# Patient Record
Sex: FEMALE | Race: WHITE | Employment: OTHER | URBAN - METROPOLITAN AREA
[De-identification: names, ages, dates, MRNs, and addresses within clinical notes are randomized per-mention and may not be internally consistent; named-entity substitution may affect disease eponyms.]

---

## 2022-12-19 ENCOUNTER — APPOINTMENT (OUTPATIENT)
Dept: GENERAL RADIOLOGY | Age: 72
DRG: 481 | End: 2022-12-19
Payer: MEDICARE

## 2022-12-19 ENCOUNTER — HOSPITAL ENCOUNTER (INPATIENT)
Age: 72
LOS: 3 days | Discharge: HOME HEALTH | DRG: 481 | End: 2022-12-23
Attending: EMERGENCY MEDICINE | Admitting: HOSPITALIST
Payer: MEDICARE
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
## 2022-12-19 DIAGNOSIS — S72.141A CLOSED INTERTROCHANTERIC FRACTURE OF HIP, RIGHT, INITIAL ENCOUNTER (HCC): Primary | ICD-10-CM

## 2022-12-19 LAB
ALBUMIN SERPL-MCNC: 3.9 G/DL (ref 3.2–4.6)
ALBUMIN/GLOB SERPL: 1.1 {RATIO} (ref 0.4–1.6)
ALP SERPL-CCNC: 71 U/L (ref 50–136)
ALT SERPL-CCNC: 31 U/L (ref 12–65)
ANION GAP SERPL CALC-SCNC: 5 MMOL/L (ref 2–11)
AST SERPL-CCNC: 35 U/L (ref 15–37)
BASOPHILS # BLD: 0.1 K/UL (ref 0–0.2)
BASOPHILS NFR BLD: 1 % (ref 0–2)
BILIRUB SERPL-MCNC: 1.3 MG/DL (ref 0.2–1.1)
BUN SERPL-MCNC: 15 MG/DL (ref 8–23)
CALCIUM SERPL-MCNC: 9.2 MG/DL (ref 8.3–10.4)
CHLORIDE SERPL-SCNC: 104 MMOL/L (ref 101–110)
CO2 SERPL-SCNC: 26 MMOL/L (ref 21–32)
CREAT SERPL-MCNC: 0.6 MG/DL (ref 0.6–1)
DIFFERENTIAL METHOD BLD: ABNORMAL
EOSINOPHIL # BLD: 0.1 K/UL (ref 0–0.8)
EOSINOPHIL NFR BLD: 1 % (ref 0.5–7.8)
ERYTHROCYTE [DISTWIDTH] IN BLOOD BY AUTOMATED COUNT: 13.3 % (ref 11.9–14.6)
GLOBULIN SER CALC-MCNC: 3.6 G/DL (ref 2.8–4.5)
GLUCOSE SERPL-MCNC: 103 MG/DL (ref 65–100)
HCT VFR BLD AUTO: 39.7 % (ref 35.8–46.3)
HGB BLD-MCNC: 13.2 G/DL (ref 11.7–15.4)
IMM GRANULOCYTES # BLD AUTO: 0.1 K/UL (ref 0–0.5)
IMM GRANULOCYTES NFR BLD AUTO: 1 % (ref 0–5)
LYMPHOCYTES # BLD: 1 K/UL (ref 0.5–4.6)
LYMPHOCYTES NFR BLD: 9 % (ref 13–44)
MCH RBC QN AUTO: 33.5 PG (ref 26.1–32.9)
MCHC RBC AUTO-ENTMCNC: 33.2 G/DL (ref 31.4–35)
MCV RBC AUTO: 100.8 FL (ref 82–102)
MONOCYTES # BLD: 0.6 K/UL (ref 0.1–1.3)
MONOCYTES NFR BLD: 6 % (ref 4–12)
NEUTS SEG # BLD: 8.7 K/UL (ref 1.7–8.2)
NEUTS SEG NFR BLD: 82 % (ref 43–78)
NRBC # BLD: 0 K/UL (ref 0–0.2)
PLATELET # BLD AUTO: 269 K/UL (ref 150–450)
PMV BLD AUTO: 9.7 FL (ref 9.4–12.3)
POTASSIUM SERPL-SCNC: 3.6 MMOL/L (ref 3.5–5.1)
PROT SERPL-MCNC: 7.5 G/DL (ref 6.3–8.2)
RBC # BLD AUTO: 3.94 M/UL (ref 4.05–5.2)
SODIUM SERPL-SCNC: 135 MMOL/L (ref 133–143)
WBC # BLD AUTO: 10.5 K/UL (ref 4.3–11.1)

## 2022-12-19 PROCEDURE — 85025 COMPLETE CBC W/AUTO DIFF WBC: CPT

## 2022-12-19 PROCEDURE — 99285 EMERGENCY DEPT VISIT HI MDM: CPT

## 2022-12-19 PROCEDURE — 71045 X-RAY EXAM CHEST 1 VIEW: CPT

## 2022-12-19 PROCEDURE — 73590 X-RAY EXAM OF LOWER LEG: CPT

## 2022-12-19 PROCEDURE — 73552 X-RAY EXAM OF FEMUR 2/>: CPT

## 2022-12-19 PROCEDURE — 73502 X-RAY EXAM HIP UNI 2-3 VIEWS: CPT

## 2022-12-19 PROCEDURE — 86900 BLOOD TYPING SEROLOGIC ABO: CPT

## 2022-12-19 PROCEDURE — 80053 COMPREHEN METABOLIC PANEL: CPT

## 2022-12-19 RX ORDER — AMLODIPINE BESYLATE 10 MG/1
5 TABLET ORAL
Status: COMPLETED | OUTPATIENT
Start: 2022-12-19 | End: 2022-12-20

## 2022-12-19 ASSESSMENT — PAIN DESCRIPTION - ORIENTATION: ORIENTATION: RIGHT

## 2022-12-19 ASSESSMENT — PAIN DESCRIPTION - LOCATION: LOCATION: KNEE

## 2022-12-19 ASSESSMENT — PAIN SCALES - GENERAL: PAINLEVEL_OUTOF10: 3

## 2022-12-19 ASSESSMENT — ENCOUNTER SYMPTOMS: COUGH: 0

## 2022-12-20 ENCOUNTER — ANESTHESIA EVENT (OUTPATIENT)
Dept: SURGERY | Age: 72
DRG: 481 | End: 2022-12-20
Payer: MEDICARE

## 2022-12-20 PROBLEM — E78.00 HYPERCHOLESTEROLEMIA: Status: ACTIVE | Noted: 2017-09-06

## 2022-12-20 PROBLEM — S72.001A CLOSED RIGHT HIP FRACTURE, INITIAL ENCOUNTER (HCC): Status: ACTIVE | Noted: 2022-12-20

## 2022-12-20 LAB
ABO + RH BLD: NORMAL
APPEARANCE UR: CLEAR
BILIRUB UR QL: NEGATIVE
BLOOD GROUP ANTIBODIES SERPL: NORMAL
COLOR UR: ABNORMAL
GLUCOSE UR STRIP.AUTO-MCNC: NEGATIVE MG/DL
HGB UR QL STRIP: NEGATIVE
KETONES UR QL STRIP.AUTO: 80 MG/DL
LEUKOCYTE ESTERASE UR QL STRIP.AUTO: NEGATIVE
NITRITE UR QL STRIP.AUTO: NEGATIVE
PH UR STRIP: 6 [PH] (ref 5–9)
PROT UR STRIP-MCNC: NEGATIVE MG/DL
SP GR UR REFRACTOMETRY: 1.02 (ref 1–1.02)
SPECIMEN EXP DATE BLD: NORMAL
UROBILINOGEN UR QL STRIP.AUTO: 0.2 EU/DL (ref 0.2–1)

## 2022-12-20 PROCEDURE — 6370000000 HC RX 637 (ALT 250 FOR IP): Performed by: EMERGENCY MEDICINE

## 2022-12-20 PROCEDURE — 81003 URINALYSIS AUTO W/O SCOPE: CPT

## 2022-12-20 PROCEDURE — 6360000002 HC RX W HCPCS: Performed by: FAMILY MEDICINE

## 2022-12-20 PROCEDURE — 2580000003 HC RX 258: Performed by: HOSPITALIST

## 2022-12-20 PROCEDURE — 6360000002 HC RX W HCPCS: Performed by: EMERGENCY MEDICINE

## 2022-12-20 PROCEDURE — 6370000000 HC RX 637 (ALT 250 FOR IP): Performed by: HOSPITALIST

## 2022-12-20 PROCEDURE — 1100000000 HC RM PRIVATE

## 2022-12-20 PROCEDURE — 99222 1ST HOSP IP/OBS MODERATE 55: CPT | Performed by: ORTHOPAEDIC SURGERY

## 2022-12-20 RX ORDER — MORPHINE SULFATE 4 MG/ML
4 INJECTION INTRAVENOUS ONCE
Status: COMPLETED | OUTPATIENT
Start: 2022-12-20 | End: 2022-12-20

## 2022-12-20 RX ORDER — MORPHINE SULFATE 2 MG/ML
2 INJECTION, SOLUTION INTRAMUSCULAR; INTRAVENOUS
Status: DISCONTINUED | OUTPATIENT
Start: 2022-12-20 | End: 2022-12-21 | Stop reason: SDUPTHER

## 2022-12-20 RX ORDER — ACETAMINOPHEN 160 MG
3 TABLET,DISINTEGRATING ORAL
COMMUNITY

## 2022-12-20 RX ORDER — AMLODIPINE BESYLATE 5 MG/1
5 TABLET ORAL DAILY
COMMUNITY
Start: 2022-10-04 | End: 2023-10-04

## 2022-12-20 RX ORDER — METOPROLOL SUCCINATE 50 MG/1
50 TABLET, EXTENDED RELEASE ORAL DAILY
Status: DISCONTINUED | OUTPATIENT
Start: 2022-12-20 | End: 2022-12-23 | Stop reason: HOSPADM

## 2022-12-20 RX ORDER — ATORVASTATIN CALCIUM 10 MG/1
10 TABLET, FILM COATED ORAL DAILY
Status: DISCONTINUED | OUTPATIENT
Start: 2022-12-20 | End: 2022-12-23 | Stop reason: HOSPADM

## 2022-12-20 RX ORDER — SODIUM CHLORIDE 0.9 % (FLUSH) 0.9 %
5-40 SYRINGE (ML) INJECTION PRN
Status: DISCONTINUED | OUTPATIENT
Start: 2022-12-20 | End: 2022-12-23 | Stop reason: HOSPADM

## 2022-12-20 RX ORDER — OXYCODONE AND ACETAMINOPHEN 7.5; 325 MG/1; MG/1
1 TABLET ORAL EVERY 6 HOURS PRN
Status: DISCONTINUED | OUTPATIENT
Start: 2022-12-20 | End: 2022-12-21 | Stop reason: SDUPTHER

## 2022-12-20 RX ORDER — ONDANSETRON 2 MG/ML
4 INJECTION INTRAMUSCULAR; INTRAVENOUS EVERY 6 HOURS PRN
Status: DISCONTINUED | OUTPATIENT
Start: 2022-12-20 | End: 2022-12-21 | Stop reason: SDUPTHER

## 2022-12-20 RX ORDER — ACETAMINOPHEN 325 MG/1
650 TABLET ORAL EVERY 6 HOURS PRN
Status: DISCONTINUED | OUTPATIENT
Start: 2022-12-20 | End: 2022-12-23 | Stop reason: HOSPADM

## 2022-12-20 RX ORDER — ONDANSETRON 2 MG/ML
4 INJECTION INTRAMUSCULAR; INTRAVENOUS ONCE
Status: COMPLETED | OUTPATIENT
Start: 2022-12-20 | End: 2022-12-20

## 2022-12-20 RX ORDER — OXYCODONE AND ACETAMINOPHEN 7.5; 325 MG/1; MG/1
1 TABLET ORAL EVERY 4 HOURS PRN
Status: ON HOLD | COMMUNITY
Start: 2012-02-14 | End: 2022-12-22 | Stop reason: HOSPADM

## 2022-12-20 RX ORDER — ONDANSETRON 4 MG/1
4 TABLET, ORALLY DISINTEGRATING ORAL EVERY 8 HOURS PRN
Status: DISCONTINUED | OUTPATIENT
Start: 2022-12-20 | End: 2022-12-21 | Stop reason: SDUPTHER

## 2022-12-20 RX ORDER — FAMOTIDINE 40 MG/1
40 TABLET, FILM COATED ORAL 2 TIMES DAILY
COMMUNITY
Start: 2022-08-01

## 2022-12-20 RX ORDER — ZOLPIDEM TARTRATE 10 MG/1
10 TABLET ORAL
COMMUNITY
Start: 2022-03-17

## 2022-12-20 RX ORDER — SODIUM CHLORIDE 9 MG/ML
INJECTION, SOLUTION INTRAVENOUS PRN
Status: DISCONTINUED | OUTPATIENT
Start: 2022-12-20 | End: 2022-12-23 | Stop reason: HOSPADM

## 2022-12-20 RX ORDER — ENOXAPARIN SODIUM 100 MG/ML
40 INJECTION SUBCUTANEOUS EVERY 24 HOURS
Status: DISCONTINUED | OUTPATIENT
Start: 2022-12-20 | End: 2022-12-23

## 2022-12-20 RX ORDER — SODIUM CHLORIDE 0.9 % (FLUSH) 0.9 %
5-40 SYRINGE (ML) INJECTION EVERY 12 HOURS SCHEDULED
Status: DISCONTINUED | OUTPATIENT
Start: 2022-12-20 | End: 2022-12-23 | Stop reason: HOSPADM

## 2022-12-20 RX ORDER — CALCIUM CARBONATE 500(1250)
1000 TABLET ORAL DAILY
COMMUNITY

## 2022-12-20 RX ORDER — POLYETHYLENE GLYCOL 3350 17 G/17G
17 POWDER, FOR SOLUTION ORAL DAILY PRN
Status: DISCONTINUED | OUTPATIENT
Start: 2022-12-20 | End: 2022-12-23 | Stop reason: HOSPADM

## 2022-12-20 RX ORDER — METOPROLOL SUCCINATE 100 MG/1
50 TABLET, EXTENDED RELEASE ORAL DAILY
COMMUNITY
Start: 2022-10-04 | End: 2023-10-04

## 2022-12-20 RX ORDER — ATORVASTATIN CALCIUM 10 MG/1
10 TABLET, FILM COATED ORAL
COMMUNITY
Start: 2022-10-04

## 2022-12-20 RX ORDER — ACETAMINOPHEN 650 MG/1
650 SUPPOSITORY RECTAL EVERY 6 HOURS PRN
Status: DISCONTINUED | OUTPATIENT
Start: 2022-12-20 | End: 2022-12-23 | Stop reason: HOSPADM

## 2022-12-20 RX ORDER — SODIUM CHLORIDE 9 MG/ML
INJECTION, SOLUTION INTRAVENOUS CONTINUOUS
Status: DISCONTINUED | OUTPATIENT
Start: 2022-12-20 | End: 2022-12-23

## 2022-12-20 RX ORDER — ASPIRIN 81 MG/1
81 TABLET ORAL EVERY MORNING
Status: ON HOLD | COMMUNITY
End: 2022-12-22 | Stop reason: HOSPADM

## 2022-12-20 RX ADMIN — AMLODIPINE BESYLATE 5 MG: 10 TABLET ORAL at 01:46

## 2022-12-20 RX ADMIN — MORPHINE SULFATE 2 MG: 2 INJECTION, SOLUTION INTRAMUSCULAR; INTRAVENOUS at 09:29

## 2022-12-20 RX ADMIN — SODIUM CHLORIDE, PRESERVATIVE FREE 10 ML: 5 INJECTION INTRAVENOUS at 20:36

## 2022-12-20 RX ADMIN — OXYCODONE AND ACETAMINOPHEN 1 TABLET: 7.5; 325 TABLET ORAL at 20:40

## 2022-12-20 RX ADMIN — SODIUM CHLORIDE: 9 INJECTION, SOLUTION INTRAVENOUS at 03:34

## 2022-12-20 RX ADMIN — ENOXAPARIN SODIUM 40 MG: 100 INJECTION SUBCUTANEOUS at 14:08

## 2022-12-20 RX ADMIN — ONDANSETRON 4 MG: 2 INJECTION INTRAMUSCULAR; INTRAVENOUS at 01:45

## 2022-12-20 RX ADMIN — MORPHINE SULFATE 4 MG: 4 INJECTION INTRAVENOUS at 01:45

## 2022-12-20 ASSESSMENT — PAIN SCALES - GENERAL
PAINLEVEL_OUTOF10: 5
PAINLEVEL_OUTOF10: 5
PAINLEVEL_OUTOF10: 0

## 2022-12-20 ASSESSMENT — PAIN DESCRIPTION - ORIENTATION: ORIENTATION: MID

## 2022-12-20 ASSESSMENT — PAIN DESCRIPTION - LOCATION: LOCATION: BACK

## 2022-12-20 ASSESSMENT — PAIN DESCRIPTION - PAIN TYPE: TYPE: ACUTE PAIN

## 2022-12-20 ASSESSMENT — PAIN DESCRIPTION - ONSET: ONSET: GRADUAL

## 2022-12-20 ASSESSMENT — PAIN DESCRIPTION - FREQUENCY: FREQUENCY: INTERMITTENT

## 2022-12-20 ASSESSMENT — PAIN - FUNCTIONAL ASSESSMENT: PAIN_FUNCTIONAL_ASSESSMENT: PREVENTS OR INTERFERES SOME ACTIVE ACTIVITIES AND ADLS

## 2022-12-20 ASSESSMENT — PAIN DESCRIPTION - DESCRIPTORS: DESCRIPTORS: ACHING

## 2022-12-20 NOTE — PROGRESS NOTES
Hospitalist Progress Note   Admit Date:  2022  8:13 PM   Name:  Carmella Burgos   Age:  67 y.o. Sex:  female  :  1950   MRN:  [id number]   Room:  ER02/02    Presenting Complaint: Fall     Reason(s) for Admission: Closed right hip fracture, initial encounter [facility] [facility] [place]   77-year-old female with history of HTN presented to the ED after due to a fall after she tripped over by her dog and landed on her right hip. In the ER, XR right hip shows intertrochanteric right hip fracture. Orthopedic surgery was consulted and plans for right gamma nail insertion procedure. Subjective & 24hr Events (22): Patient was alert and oriented x3 this morning. Son at bedside. She reported ongoing right hip pain. Otherwise, denies fever, chills, SOB, chest pain, abdominal pain. Assessment & Plan:     Closed right hip fracture, initial encounter   2/2 mechanical fall. XR right hip shows intertrochanteric right hip fracture. Follow Hgb&Hct post-op  PT/OT post-op  Analgesics prn  DVT ppx per primary  Ortho on board, appreciate recs  Plans for OR on     Hypercholesterolemia  Cont statin    Essential hypertension  Cont home metoprolol      Anticipated discharge needs:      2-3 pending surgery, ortho recs. PT/OT to eval    Diet:  ADULT DIET; Regular  Diet NPO  DVT PPx: Lovenox SQ  Code status: Full Code    Hospital Problems:  Principal Problem:    Closed right hip fracture, initial encounter [facility])  Active Problems:    Hypercholesterolemia    Essential hypertension  Resolved Problems:    * No resolved hospital problems.  *      Objective:   Patient Vitals for the past 24 hrs:   Temp Pulse Resp BP SpO2   22 1145 -- 62 27 -- --   22 1130 -- 57 15 -- --   22 1115 -- 58 14 -- --   22 1100 -- 58 13 -- --   22 1045 -- 58 18 -- --   22 1030 -- 57 16 -- --   22 1015 -- 62 18 -- --   22 1000 -- 61 18 -- --   22 0945 -- 60 29 -- -- 12/20/22 0930 -- 59 21 -- --   12/20/22 0845 -- 60 19 102/68 --   12/20/22 0830 -- 60 15 112/75 --   12/20/22 0815 -- 61 18 108/76 --   12/20/22 0800 -- 59 17 110/75 --   12/20/22 0745 -- 60 18 93/69 --   12/20/22 0730 -- 57 17 96/72 --   12/20/22 0715 -- 55 13 98/82 --   12/20/22 0546 -- 62 20 109/67 --   12/20/22 0530 -- 54 14 95/68 --   12/20/22 0515 -- 60 12 99/69 --   12/20/22 0500 -- 53 12 97/63 --   12/20/22 0445 -- 52 13 101/71 --   12/20/22 0430 -- 55 13 106/83 --   12/20/22 0415 -- 54 17 99/71 --   12/20/22 0400 -- 56 13 105/72 --   12/20/22 0345 -- 55 14 101/73 --   12/20/22 0330 -- 57 18 102/68 --   12/20/22 0315 -- 57 22 106/67 --   12/20/22 0200 -- 63 17 117/75 --   12/20/22 0146 -- -- -- (!) 140/109 --   12/20/22 0145 -- -- 20 -- --   12/20/22 0100 -- 61 15 119/74 --   12/20/22 0045 -- 57 21 119/74 --   12/20/22 0030 -- 59 18 118/78 --   12/20/22 0015 -- 55 15 117/72 --   12/20/22 0000 -- 56 15 121/74 --   12/19/22 2345 -- 56 16 115/79 --   12/19/22 2330 -- 60 13 100/74 --   12/19/22 2315 -- 61 18 116/81 --   12/19/22 2300 -- 57 21 111/80 --   12/19/22 2245 -- 54 16 123/80 --   12/19/22 2045 -- -- -- -- 100 %   12/19/22 2044 98.3 °F (36.8 °C) 70 20 -- --   12/19/22 2043 98.3 °F (36.8 °C) -- -- -- --   12/19/22 2033 -- 53 18 -- --   12/19/22 2032 -- 67 20 -- --   12/19/22 2031 -- 56 19 -- --   12/19/22 2030 -- -- -- 138/80 --   12/19/22 2027 -- -- -- 132/80 --   12/19/22 2020 -- -- -- 126/82 --       Oxygen Therapy  SpO2: 100 %  O2 Device: None (Room air)    Estimated body mass index is 21.97 kg/m² as calculated from the following:    Height as of this encounter: 5' 4\" (1.626 m). Weight as of this encounter: 128 lb (58.1 kg). No intake or output data in the 24 hours ending 12/20/22 1310      Physical Exam:     Blood pressure 102/68, pulse 62, temperature 98.3 °F (36.8 °C), temperature source Oral, resp. rate 27, height 5' 4\" (1.626 m), weight 128 lb (58.1 kg), SpO2 100 %.   General:    Well nourished. Head:  Normocephalic, atraumatic  Eyes:  Sclerae appear normal.  Pupils equally round. ENT:  Nares appear normal, no drainage. Moist oral mucosa  Neck:  No restricted ROM. Trachea midline   CV:   RRR. No m/r/g. No jugular venous distension. Lungs:   CTAB. No wheezing, rhonchi, or rales. Symmetric expansion. Abdomen: Bowel sounds present. Soft, nontender, nondistended. Extremities: No cyanosis or clubbing. No edema  Skin:     No rashes and normal coloration. Warm and dry. Neuro:  CN II-XII grossly intact. Sensation intact. A&Ox3  Psych:  Normal mood and affect.       I have personally reviewed labs and tests showing:  Recent Labs:  Recent Results (from the past 48 hour(s))   CBC with Auto Differential    Collection Time: 12/19/22 10:22 PM   Result Value Ref Range    WBC 10.5 4.3 - 11.1 K/uL    RBC 3.94 (L) 4.05 - 5.2 M/uL    Hemoglobin 13.2 11.7 - 15.4 g/dL    Hematocrit 39.7 35.8 - 46.3 %    .8 82 - 102 FL    MCH 33.5 (H) 26.1 - 32.9 PG    MCHC 33.2 31.4 - 35.0 g/dL    RDW 13.3 11.9 - 14.6 %    Platelets 969 638 - 624 K/uL    MPV 9.7 9.4 - 12.3 FL    nRBC 0.00 0.0 - 0.2 K/uL    Differential Type AUTOMATED      Seg Neutrophils 82 (H) 43 - 78 %    Lymphocytes 9 (L) 13 - 44 %    Monocytes 6 4.0 - 12.0 %    Eosinophils % 1 0.5 - 7.8 %    Basophils 1 0.0 - 2.0 %    Immature Granulocytes 1 0.0 - 5.0 %    Segs Absolute 8.7 (H) 1.7 - 8.2 K/UL    Absolute Lymph # 1.0 0.5 - 4.6 K/UL    Absolute Mono # 0.6 0.1 - 1.3 K/UL    Absolute Eos # 0.1 0.0 - 0.8 K/UL    Basophils Absolute 0.1 0.0 - 0.2 K/UL    Absolute Immature Granulocyte 0.1 0.0 - 0.5 K/UL   Comprehensive Metabolic Panel    Collection Time: 12/19/22 10:22 PM   Result Value Ref Range    Sodium 135 133 - 143 mmol/L    Potassium 3.6 3.5 - 5.1 mmol/L    Chloride 104 101 - 110 mmol/L    CO2 26 21 - 32 mmol/L    Anion Gap 5 2 - 11 mmol/L    Glucose 103 (H) 65 - 100 mg/dL    BUN 15 8 - 23 MG/DL    Creatinine 0.60 0.6 - 1.0 MG/DL Est, Glom Filt Rate >60 >60 ml/min/1.73m2    Calcium 9.2 8.3 - 10.4 MG/DL    Total Bilirubin 1.3 (H) 0.2 - 1.1 MG/DL    ALT 31 12 - 65 U/L    AST 35 15 - 37 U/L    Alk Phosphatase 71 50 - 136 U/L    Total Protein 7.5 6.3 - 8.2 g/dL    Albumin 3.9 3.2 - 4.6 g/dL    Globulin 3.6 2.8 - 4.5 g/dL    Albumin/Globulin Ratio 1.1 0.4 - 1.6     TYPE AND SCREEN    Collection Time: 12/19/22 10:22 PM   Result Value Ref Range    Crossmatch expiration date 12/22/2022,2359     ABO/Rh O POSITIVE     Antibody Screen NEG    Urinalysis    Collection Time: 12/20/22  2:14 AM   Result Value Ref Range    Color, UA YELLOW/STRAW      Appearance CLEAR      Specific Gravity, UA 1.021 1.001 - 1.023      pH, Urine 6.0 5.0 - 9.0      Protein, UA Negative NEG mg/dL    Glucose, UA Negative mg/dL    Ketones, Urine 80 (A) NEG mg/dL    Bilirubin Urine Negative NEG      Blood, Urine Negative NEG      Urobilinogen, Urine 0.2 0.2 - 1.0 EU/dL    Nitrite, Urine Negative NEG      Leukocyte Esterase, Urine Negative NEG         I have personally reviewed imaging studies showing: Other Studies:  XR HIP RIGHT (2-3 VIEWS)   Final Result   Intertrochanteric right hip fracture         XR FEMUR RIGHT (MIN 2 VIEWS)   Final Result   Intertrochanteric right hip fracture      XR TIBIA FIBULA RIGHT (2 VIEWS)   Final Result   Somewhat limited views due to positioning. No definite acute   fracture      XR CHEST PORTABLE   Final Result   1.  2 small nodules in the right upper lung, most likely granulomas. 2.  Possible aortic aneurysm. Recommend CT to evaluate both the lung nodules and the aortic arch.              Current Meds:  Current Facility-Administered Medications   Medication Dose Route Frequency    sodium chloride flush 0.9 % injection 5-40 mL  5-40 mL IntraVENous 2 times per day    sodium chloride flush 0.9 % injection 5-40 mL  5-40 mL IntraVENous PRN    0.9 % sodium chloride infusion   IntraVENous PRN    ondansetron (ZOFRAN-ODT) disintegrating tablet 4 mg 4 mg Oral Q8H PRN    Or    ondansetron (ZOFRAN) injection 4 mg  4 mg IntraVENous Q6H PRN    polyethylene glycol (GLYCOLAX) packet 17 g  17 g Oral Daily PRN    acetaminophen (TYLENOL) tablet 650 mg  650 mg Oral Q6H PRN    Or    acetaminophen (TYLENOL) suppository 650 mg  650 mg Rectal Q6H PRN    0.9 % sodium chloride infusion   IntraVENous Continuous    atorvastatin (LIPITOR) tablet 10 mg  10 mg Oral Daily    [Held by provider] metoprolol succinate (TOPROL XL) extended release tablet 50 mg  50 mg Oral Daily    oxyCODONE-acetaminophen (PERCOCET) 7.5-325 MG per tablet 1 tablet  1 tablet Oral Q6H PRN    ceFAZolin (ANCEF) 2000 mg in sterile water 20 mL IV syringe  2,000 mg IntraVENous On Call to OR    tuberculin injection 5 Units  5 Units IntraDERmal Once    morphine injection 2 mg  2 mg IntraVENous Q2H PRN     Current Outpatient Medications   Medication Sig    amLODIPine (NORVASC) 5 MG tablet Take 5 mg by mouth daily    atorvastatin (LIPITOR) 10 MG tablet Take 10 mg by mouth    oxyCODONE-acetaminophen (PERCOCET) 7.5-325 MG per tablet Take 1 tablet by mouth every 4 hours as needed. metoprolol succinate (TOPROL XL) 100 MG extended release tablet Take 50 mg by mouth daily       Signed: Sheryle Quin, DO    Part of this note may have been written by using a voice dictation software. The note has been proof read but may still contain some grammatical/other typographical errors.

## 2022-12-20 NOTE — ED TRIAGE NOTES
Pt tripped while trying to untangle the dog chain       Pt states she is having right leg pain (upper leg)     Pt was none ambulatory     Pain 2/10

## 2022-12-20 NOTE — PROGRESS NOTES
Physical Therapy Note:    Attempted to see patient this AM for physical therapy evaluation session. Patient admitted with R proximal femur fracture. Per ortho, plans for surgery today. . Will follow and re-attempt as schedule permits/patient available.  Thank you    Lexus Sahni, PT     Rehab Caseload Tracker

## 2022-12-20 NOTE — ED NOTES
TRANSFER - OUT REPORT:    Verbal report given to zina on Nathalia Broussard  being transferred to 7th floor for routine progression of patient care       Report consisted of patient's Situation, Background, Assessment and   Recommendations(SBAR). Information from the following report(s) Nurse Handoff Report was reviewed with the receiving nurse. Eros Assessment: Presents to emergency department  because of falls (Syncope, seizure, or loss of consciousness): Yes, Age > 79: Yes, Altered Mental Status, Intoxication with alcohol or substance confusion (Disorientation, impaired judgment, poor safety awaremess, or inability to follow instructions): No, Impaired Mobility: Ambulates or transfers with assistive devices or assistance; Unable to ambulate or transer.: Yes, Nursing Judgement: Yes  Lines:   Peripheral IV 12/19/22 Left; Anterior Forearm (Active)   Site Assessment Clean, dry & intact 12/20/22 0556   Line Status Flushed;Capped 12/20/22 0556   Phlebitis Assessment No symptoms 12/20/22 0556   Infiltration Assessment 0 12/20/22 0556        Opportunity for questions and clarification was provided.       Patient transported with:  Mayank Reyes RN  12/20/22 6860

## 2022-12-20 NOTE — ANESTHESIA PRE PROCEDURE
Department of Anesthesiology  Preprocedure Note       Name:  Zhanna Kim   Age:  67 y.o.  :  1950                                          MRN:  [id number]         Date:  2022      Surgeon: Estefania Flower):  Siddhartha Molina MD    Procedure: Procedure(s):  RIGHT GAMMA NAIL  INSERTION    Medications prior to admission:   Prior to Admission medications    Medication Sig Start Date End Date Taking? Authorizing Provider   amLODIPine (NORVASC) 5 MG tablet Take 5 mg by mouth daily 10/4/22 10/4/23 Yes Historical Provider, MD   atorvastatin (LIPITOR) 10 MG tablet Take 10 mg by mouth 10/4/22  Yes Historical Provider, MD   oxyCODONE-acetaminophen (PERCOCET) 7.5-325 MG per tablet Take 1 tablet by mouth every 4 hours as needed. Patient not taking: Reported on 2022  Yes Historical Provider, MD   metoprolol succinate (TOPROL XL) 100 MG extended release tablet Take 50 mg by mouth daily 10/4/22 10/4/23 Yes Historical Provider, MD   famotidine (PEPCID) 40 MG tablet Take 40 mg by mouth 2 times daily 22  Yes Historical Provider, MD   zolpidem (AMBIEN) 10 MG tablet Take 10 mg by mouth.  3/17/22  Yes Historical Provider, MD   calcium carbonate (OSCAL) 500 MG TABS tablet Take 1,000 mg by mouth daily   Yes Historical Provider, MD   Cholecalciferol (VITAMIN D3) 50 MCG (2000 UT) CAPS Take 3 capsules by mouth   Yes Historical Provider, MD   aspirin 81 MG EC tablet Take 81 mg by mouth every morning    Historical Provider, MD   cyanocobalamin 1000 MCG tablet Take 1,000 mcg by mouth daily    Historical Provider, MD       Current medications:    Current Facility-Administered Medications   Medication Dose Route Frequency Provider Last Rate Last Admin    sodium chloride flush 0.9 % injection 5-40 mL  5-40 mL IntraVENous 2 times per day Sebastien Mao MD        sodium chloride flush 0.9 % injection 5-40 mL  5-40 mL IntraVENous PRN Leonard Bright MD        0.9 % sodium chloride infusion   IntraVENous PRN Sebastien Mao MD  ondansetron (ZOFRAN-ODT) disintegrating tablet 4 mg  4 mg Oral Q8H PRN Leonard Bright MD        Or    ondansetron (ZOFRAN) injection 4 mg  4 mg IntraVENous Q6H PRN Leonard Bright MD        polyethylene glycol (GLYCOLAX) packet 17 g  17 g Oral Daily PRN Rosi Pichardo MD        acetaminophen (TYLENOL) tablet 650 mg  650 mg Oral Q6H PRN Rosi Pichardo MD        Or    acetaminophen (TYLENOL) suppository 650 mg  650 mg Rectal Q6H PRN Leonard Bright MD        0.9 % sodium chloride infusion   IntraVENous Continuous Leonard Bright  mL/hr at 12/20/22 0334 New Bag at 12/20/22 0334    atorvastatin (LIPITOR) tablet 10 mg  10 mg Oral Daily Rosi Pichardo MD        [Held by provider] metoprolol succinate (TOPROL XL) extended release tablet 50 mg  50 mg Oral Daily Leonard Bright MD        oxyCODONE-acetaminophen (PERCOCET) 7.5-325 MG per tablet 1 tablet  1 tablet Oral Q6H PRN Rosi Pichardo MD        ceFAZolin (ANCEF) 2000 mg in sterile water 20 mL IV syringe  2,000 mg IntraVENous On Call to 1100 Surjit Lutz MD        tuberculin injection 5 Units  5 Units IntraDERmal Once Thu Gutierrez, DO        morphine injection 2 mg  2 mg IntraVENous Q2H PRN Thu Gutierrez, DO   2 mg at 12/20/22 0929    enoxaparin (LOVENOX) injection 40 mg  40 mg SubCUTAneous Q24H Thu Gutierrez, DO   40 mg at 12/20/22 1408       Allergies: Allergies   Allergen Reactions    Penicillins Anaphylaxis and Swelling    Strawberry Anaphylaxis and Hives       Problem List:    Patient Active Problem List   Diagnosis Code    [facility] S[facility]    Hypercholesterolemia E78.00    Essential hypertension I10       Past Medical History:        Diagnosis Date    GERD (gastroesophageal reflux disease)     Hypertension        Past Surgical History:  History reviewed. No pertinent surgical history.     Social History:    Social History     Tobacco Use    Smoking status: Never    Smokeless tobacco: Never   Substance Use Topics    Alcohol use: Never                                Counseling given: Not Answered      Vital Signs (Current):   Vitals:    12/20/22 1115 12/20/22 1130 12/20/22 1145 12/20/22 1420   BP:    129/77   Pulse: 58 57 62 64   Resp: 14 15 27 16   Temp:    98.1 °F (36.7 °C)   TempSrc:    Oral   SpO2:    93%   Weight:       Height:                                                  BP Readings from Last 3 Encounters:   12/20/22 129/77       NPO Status:                                                                                 BMI:   Wt Readings from Last 3 Encounters:   12/19/22 128 lb (58.1 kg)     Body mass index is 21.97 kg/m². CBC:   Lab Results   Component Value Date/Time    WBC 10.5 12/19/2022 10:22 PM    RBC 3.94 12/19/2022 10:22 PM    HGB 13.2 12/19/2022 10:22 PM    HCT 39.7 12/19/2022 10:22 PM    .8 12/19/2022 10:22 PM    RDW 13.3 12/19/2022 10:22 PM     12/19/2022 10:22 PM       CMP:   Lab Results   Component Value Date/Time     12/19/2022 10:22 PM    K 3.6 12/19/2022 10:22 PM     12/19/2022 10:22 PM    CO2 26 12/19/2022 10:22 PM    BUN 15 12/19/2022 10:22 PM    CREATININE 0.60 12/19/2022 10:22 PM    LABGLOM >60 12/19/2022 10:22 PM    GLUCOSE 103 12/19/2022 10:22 PM    PROT 7.5 12/19/2022 10:22 PM    CALCIUM 9.2 12/19/2022 10:22 PM    BILITOT 1.3 12/19/2022 10:22 PM    ALKPHOS 71 12/19/2022 10:22 PM    AST 35 12/19/2022 10:22 PM    ALT 31 12/19/2022 10:22 PM       POC Tests: No results for input(s): POCGLU, POCNA, POCK, POCCL, POCBUN, POCHEMO, POCHCT in the last 72 hours. Coags: No results found for: PROTIME, INR, APTT    HCG (If Applicable): No results found for: PREGTESTUR, PREGSERUM, HCG, HCGQUANT     ABGs: No results found for: PHART, PO2ART, VXZ9XLJ, LHR4DOG, BEART, S4YGPRHV     Type & Screen (If Applicable):  No results found for: LABABO, LABRH    Drug/Infectious Status (If Applicable):  No results found for: HIV, HEPCAB    COVID-19 Screening (If Applicable):  No results found for: COVID19        Anesthesia Evaluation  Patient summary reviewed and Nursing notes reviewed no history of anesthetic complications:   Airway: Mallampati: III  TM distance: >3 FB   Neck ROM: full  Comment: Small chin  Mouth opening: > = 3 FB   Dental: normal exam         Pulmonary:normal exam    (+) COPD:  asthma (Patient says she was told she had asthma by a Pulmonologist but doesn't think she actually has it. Does not use inhalers):                            Cardiovascular:  Exercise tolerance: good (>4 METS),   (+) hypertension:, hyperlipidemia               ROS comment: H/o Type B aortic dissection (2016). Followed by Vascular. Repeat CT 2020 showed dissection had healed     Neuro/Psych:               GI/Hepatic/Renal:   (+) GERD:, liver disease (fatty liver):,           Endo/Other:    (+) : arthritis: OA., .                 Abdominal:             Vascular:   + PVD, aortic or cerebral, . Other Findings:           Anesthesia Plan      general     ASA 3       Induction: intravenous. MIPS: Postoperative opioids intended and Prophylactic antiemetics administered. Anesthetic plan and risks discussed with patient and child/children.                         Angela Leal MD   12/20/2022

## 2022-12-20 NOTE — H&P
Hospitalist History and Physical   Admit Date:  2022  8:13 PM   Name:  Maria Victoria Sepulveda   Age:  67 y.o. Sex:  female  :  1950   MRN:  [id number]   Room:  ER02/02    Presenting Complaint: Fall     Reason(s) for Admission: Closed right hip fracture, initial encounter ([facility] [S72.001A]     History of Present Illness:       67years old female with past medical history of hypertension, dyslipidemia presented to the emergency room after fall. Patient reports she was tripped by her dog ended up on the right hip reports unable to weight-bear since then and experiencing severe pain so presented to ER for further evaluation. Reports history of similar fracture involving right lower extremity in the past.  Denies any loss of consciousness, denies currently being on any anticoagulants. Denies any chest pain, shortness of breath, cough, orthopnea, paroxysmal nocturnal dyspnea, lower extremity edema. Evaluated in ER, further work-up shows evidence of right intertrochanteric fracture, ER physician requested admission of this patient for further evaluation and management by orthopedics. Review of Systems:  10 systems reviewed and negative except as noted in HPI. Assessment & Plan:       Principal problem:    Right intertrochanteric fracture: We will obtain consultation with orthopedics, due to her age and medical problems we are admitting to manage dose but right intertrochanteric fracture primarily will be managed by orthopedic team.    Active problems:    Hypertension: Continue on home medications. Dyslipidemia: Current on statins. Anticipated discharge needs: Home with family          Diet: Diet NPO  VTE ppx: scd with anticipation of surgery  Code status: Full Code    Hospital Problems:  Principal Problem:    Closed right hip fracture, initial encounter [facility])  Active Problems:    Hypercholesterolemia    Essential hypertension  Resolved Problems:    * No resolved hospital problems.  * Past History:     Past Medical History:   Diagnosis Date    GERD (gastroesophageal reflux disease)     Hypertension      Past surgical history: Fracture involving lower extremity ORIF. Social History     Tobacco Use    Smoking status: Never    Smokeless tobacco: Never   Substance Use Topics    Alcohol use: Never      Social History     Substance and Sexual Activity   Drug Use Not on file     Family history: History of hypertension runs in family members. History reviewed. No pertinent family history. There is no immunization history on file for this patient.   Allergies   Allergen Reactions    Penicillins Anaphylaxis and Swelling    Bucklin Anaphylaxis and Hives     Prior to Admit Medications:  Current Outpatient Medications   Medication Instructions    amLODIPine (NORVASC) 5 mg, Oral, DAILY    atorvastatin (LIPITOR) 10 mg, Oral    metoprolol succinate (TOPROL XL) 50 mg, Oral, DAILY    oxyCODONE-acetaminophen (PERCOCET) 7.5-325 MG per tablet 1 tablet, Oral, EVERY 4 HOURS PRN         Objective:   Patient Vitals for the past 24 hrs:   Temp Pulse Resp BP SpO2   12/20/22 0100 -- 61 15 119/74 --   12/20/22 0045 -- 57 21 119/74 --   12/20/22 0030 -- 59 18 118/78 --   12/20/22 0015 -- 55 15 117/72 --   12/20/22 0000 -- 56 15 121/74 --   12/19/22 2345 -- 56 16 115/79 --   12/19/22 2330 -- 60 13 100/74 --   12/19/22 2315 -- 61 18 116/81 --   12/19/22 2300 -- 57 21 111/80 --   12/19/22 2245 -- 54 16 123/80 --   12/19/22 2045 -- -- -- -- 100 %   12/19/22 2044 98.3 °F (36.8 °C) 70 20 -- --   12/19/22 2043 98.3 °F (36.8 °C) -- -- -- --   12/19/22 2033 -- 53 18 -- --   12/19/22 2032 -- 67 20 -- --   12/19/22 2031 -- 56 19 -- --   12/19/22 2030 -- -- -- 138/80 --   12/19/22 2027 -- -- -- 132/80 --   12/19/22 2020 -- -- -- 126/82 --       Oxygen Therapy  SpO2: 100 %  O2 Device: None (Room air)    Estimated body mass index is 21.97 kg/m² as calculated from the following:    Height as of this encounter: 5' 4\" (1.626 m).    Weight as of this encounter: 128 lb (58.1 kg). No intake or output data in the 24 hours ending 12/20/22 0140      Physical Exam:    Blood pressure 119/74, pulse 61, temperature 98.3 °F (36.8 °C), temperature source Oral, resp. rate 15, height 5' 4\" (1.626 m), weight 128 lb (58.1 kg), SpO2 100 %. General:    Well nourished. Head:  Normocephalic, atraumatic  Eyes:  Sclerae appear normal.  Pupils equally round. ENT:  Nares appear normal, no drainage. Moist oral mucosa  Neck:  No restricted ROM. Trachea midline   CV:   RRR. No m/r/g. No jugular venous distension. Lungs:   CTAB. No wheezing, rhonchi, or rales. Symmetric expansion. Abdomen: Bowel sounds present. Soft, nontender, nondistended. Extremities: No cyanosis or clubbing. No edema  Skin:     No rashes and normal coloration. Warm and dry. Neuro:  CN II-XII grossly intact. Sensation intact. A&Ox3  Psych:  Normal mood and affect.       I have personally reviewed labs and tests showing:  Recent Labs:  Recent Results (from the past 24 hour(s))   CBC with Auto Differential    Collection Time: 12/19/22 10:22 PM   Result Value Ref Range    WBC 10.5 4.3 - 11.1 K/uL    RBC 3.94 (L) 4.05 - 5.2 M/uL    Hemoglobin 13.2 11.7 - 15.4 g/dL    Hematocrit 39.7 35.8 - 46.3 %    .8 82 - 102 FL    MCH 33.5 (H) 26.1 - 32.9 PG    MCHC 33.2 31.4 - 35.0 g/dL    RDW 13.3 11.9 - 14.6 %    Platelets 219 105 - 448 K/uL    MPV 9.7 9.4 - 12.3 FL    nRBC 0.00 0.0 - 0.2 K/uL    Differential Type AUTOMATED      Seg Neutrophils 82 (H) 43 - 78 %    Lymphocytes 9 (L) 13 - 44 %    Monocytes 6 4.0 - 12.0 %    Eosinophils % 1 0.5 - 7.8 %    Basophils 1 0.0 - 2.0 %    Immature Granulocytes 1 0.0 - 5.0 %    Segs Absolute 8.7 (H) 1.7 - 8.2 K/UL    Absolute Lymph # 1.0 0.5 - 4.6 K/UL    Absolute Mono # 0.6 0.1 - 1.3 K/UL    Absolute Eos # 0.1 0.0 - 0.8 K/UL    Basophils Absolute 0.1 0.0 - 0.2 K/UL    Absolute Immature Granulocyte 0.1 0.0 - 0.5 K/UL   Comprehensive Metabolic Panel    Collection Time: 12/19/22 10:22 PM   Result Value Ref Range    Sodium 135 133 - 143 mmol/L    Potassium 3.6 3.5 - 5.1 mmol/L    Chloride 104 101 - 110 mmol/L    CO2 26 21 - 32 mmol/L    Anion Gap 5 2 - 11 mmol/L    Glucose 103 (H) 65 - 100 mg/dL    BUN 15 8 - 23 MG/DL    Creatinine 0.60 0.6 - 1.0 MG/DL    Est, Glom Filt Rate >60 >60 ml/min/1.73m2    Calcium 9.2 8.3 - 10.4 MG/DL    Total Bilirubin 1.3 (H) 0.2 - 1.1 MG/DL    ALT 31 12 - 65 U/L    AST 35 15 - 37 U/L    Alk Phosphatase 71 50 - 136 U/L    Total Protein 7.5 6.3 - 8.2 g/dL    Albumin 3.9 3.2 - 4.6 g/dL    Globulin 3.6 2.8 - 4.5 g/dL    Albumin/Globulin Ratio 1.1 0.4 - 1.6     TYPE AND SCREEN    Collection Time: 12/19/22 10:22 PM   Result Value Ref Range    Crossmatch expiration date 12/22/2022,2359     ABO/Rh O POSITIVE     Antibody Screen NEG        I have personally reviewed imaging studies showing:  XR HIP RIGHT (2-3 VIEWS)    Result Date: 12/19/2022  Right Hip INDICATION: Charyl Decent, Pain Three views of the right hip were obtained FINDINGS: There is an intertrochanteric right hip fracture. Lesser trochanter is also fractured and displaced. Femoral head is normally located. Bony pelvis is intact. .     Intertrochanteric right hip fracture     XR FEMUR RIGHT (MIN 2 VIEWS)    Result Date: 12/19/2022  Right femur INDICATION: Hip fracture AP and lateral views of the right femur were obtained. FINDINGS: There is an intertrochanteric right hip fracture. Femur is otherwise intact. No other acute fractures are seen. Intertrochanteric right hip fracture    XR TIBIA FIBULA RIGHT (2 VIEWS)    Result Date: 12/19/2022  Right Tib/fib INDICATION:Leg pain after fall Limited AP and lateral views of the right tibia and fibula were obtained. FINDINGS: There is a surgical plate in the proximal tibia. No definite acute tibia or fibular fracture. Somewhat limited views due to positioning.   No definite acute fracture    XR CHEST PORTABLE    Result Date: 12/19/2022  Chest X-ray INDICATION: Preop AP/PA view of the chest was obtained. FINDINGS: There are 2 small nodular areas in the right upper lung, probably granulomas. Lungs otherwise clear. No infiltrates or effusions. The heart size is normal.  There is prominence aortic arch, 4.8 cm in diameter. No fractures or bone lesions are seen. .      1.  2 small nodules in the right upper lung, most likely granulomas. 2.  Possible aortic aneurysm. Recommend CT to evaluate both the lung nodules and the aortic arch. Echocardiogram:  No results found for this or any previous visit. Orders Placed This Encounter   Medications    amLODIPine (NORVASC) tablet 5 mg    ondansetron (ZOFRAN) injection 4 mg    morphine injection 4 mg    sodium chloride flush 0.9 % injection 5-40 mL    sodium chloride flush 0.9 % injection 5-40 mL    0.9 % sodium chloride infusion    OR Linked Order Group     ondansetron (ZOFRAN-ODT) disintegrating tablet 4 mg     ondansetron (ZOFRAN) injection 4 mg    polyethylene glycol (GLYCOLAX) packet 17 g    OR Linked Order Group     acetaminophen (TYLENOL) tablet 650 mg     acetaminophen (TYLENOL) suppository 650 mg    0.9 % sodium chloride infusion    atorvastatin (LIPITOR) tablet 10 mg    metoprolol succinate (TOPROL XL) extended release tablet 50 mg    oxyCODONE-acetaminophen (PERCOCET) 7.5-325 MG per tablet 1 tablet         Signed:  Sheridan Manzano MD    Part of this note may have been written by using a voice dictation software. The note has been proof read but may still contain some grammatical/other typographical errors.

## 2022-12-20 NOTE — ED TRIAGE NOTES
Pt arrives via [facility] from home due to fall earlier tonight. Pt states went outside to help dog and tripped over dog leash landing onto right hip. Shortening to right leg noted. No rotation noted. Pt arrives in splint to right leg. Right knee pain. Has hx of broken right broken fibula from years ago. Pt denies hitting head or LOC. Negative for blood thinners. Denies any vision changes or HA. Hx of aorta dissection and heart murmur. NAD. Denies CP or SHOB at this time.  Pain currently 3/10

## 2022-12-20 NOTE — ED PROVIDER NOTES
Emergency Department Provider Note                   PCP:                None None               Age: 67 y.o. Sex: female       ICD-10-CM    1. Closed intertrochanteric fracture of hip, right, initial encounter ([facility].)  S72.141A       2. Fall, initial encounter  [address] XXXA           DISPOSITION Decision To Admit 12/19/2022 11:31:19 PM        MDM  Number of Diagnoses or Management Options  Closed intertrochanteric fracture of hip, right, initial encounter [facility]): new, needed workup  Fall, initial encounter: new, needed workup     Amount and/or Complexity of Data Reviewed  Clinical lab tests: ordered and reviewed  Tests in the radiology section of CPT®: ordered and reviewed  Tests in the medicine section of CPT®: ordered and reviewed  Review and summarize past medical records: yes    Risk of Complications, Morbidity, and/or Mortality  Presenting problems: moderate  Diagnostic procedures: moderate  Management options: moderate    Patient Progress  Patient progress: stable                              Orders Placed This Encounter   Procedures    XR HIP RIGHT (2-3 VIEWS)    XR FEMUR RIGHT (MIN 2 VIEWS)    XR TIBIA FIBULA RIGHT (2 VIEWS)    XR CHEST PORTABLE    CBC with Auto Differential    Comprehensive Metabolic Panel    Urinalysis    EKG 12 Lead    TYPE AND SCREEN    Insert peripheral IV        Medications - No data to display    New Prescriptions    No medications on file        Marguerite Contreras is a 67 y.o. female who presents to the Emergency Department with chief complaint of    Chief Complaint   Patient presents with    Fall      79-year-old female presents via EMS from home after she was tripped by her dog and felt on her right hip. Patient was unable to stand. Denies paralysis or paresthesias and minimal pain except when moving. Pain radiates to the right knee. Patient has a history of broken right leg years ago with hardware. Denies head injury or loss of consciousness and is not on any blood thinners. Denies paralysis or paresthesias. The history is provided by the patient. Review of Systems   Constitutional:  Negative for chills and fever. Respiratory:  Negative for cough. Cardiovascular:  Negative for chest pain. Musculoskeletal:  Positive for arthralgias and gait problem. Negative for neck pain and neck stiffness. All other systems reviewed and are negative. Past Medical History:   Diagnosis Date    GERD (gastroesophageal reflux disease)     Hypertension         History reviewed. No pertinent surgical history. History reviewed. No pertinent family history. Social History     Socioeconomic History    Marital status:      Spouse name: None    Number of children: None    Years of education: None    Highest education level: None   Tobacco Use    Smoking status: Never    Smokeless tobacco: Never   Substance and Sexual Activity    Alcohol use: Never         Penicillins and Strawberry     Previous Medications    No medications on file        Vitals signs and nursing note reviewed. Patient Vitals for the past 4 hrs:   Temp Pulse Resp BP SpO2   12/19/22 2300 -- 57 21 111/80 --   12/19/22 2245 -- 54 16 123/80 --   12/19/22 2045 -- -- -- -- 100 %   12/19/22 2044 98.3 °F (36.8 °C) 70 20 -- --   12/19/22 2043 98.3 °F (36.8 °C) -- -- -- --   12/19/22 2033 -- 53 18 -- --   12/19/22 2032 -- 67 20 -- --   12/19/22 2031 -- 56 19 -- --   12/19/22 2030 -- -- -- 138/80 --   12/19/22 2027 -- -- -- 132/80 --   12/19/22 2020 -- -- -- 126/82 --          Physical Exam  Vitals and nursing note reviewed. Constitutional:       General: She is not in acute distress. HENT:      Head: Normocephalic and atraumatic. Right Ear: External ear normal.      Left Ear: External ear normal.      Nose: Nose normal.      Mouth/Throat:      Mouth: Mucous membranes are moist.   Eyes:      Extraocular Movements: Extraocular movements intact.       Conjunctiva/sclera: Conjunctivae normal.      Pupils: Pupils are equal, round, and reactive to light. Cardiovascular:      Rate and Rhythm: Normal rate and regular rhythm. Heart sounds: No murmur heard. Pulmonary:      Effort: Pulmonary effort is normal.      Breath sounds: Normal breath sounds. Abdominal:      General: Abdomen is flat. Palpations: There is no mass. Tenderness: There is no abdominal tenderness. There is no right CVA tenderness or left CVA tenderness. Musculoskeletal:         General: Tenderness and deformity present. Cervical back: Normal range of motion and neck supple. Right hip: Deformity, tenderness and bony tenderness present. Decreased range of motion. Left hip: Normal.      Right lower leg: No edema. Left lower leg: No edema. Skin:     General: Skin is warm and dry. Neurological:      General: No focal deficit present. Mental Status: She is alert and oriented to person, place, and time.    Psychiatric:         Mood and Affect: Mood and affect normal.         Speech: Speech normal.        Procedures      Results Reviewed:      Recent Results (from the past 24 hour(s))   CBC with Auto Differential    Collection Time: 12/19/22 10:22 PM   Result Value Ref Range    WBC 10.5 4.3 - 11.1 K/uL    RBC 3.94 (L) 4.05 - 5.2 M/uL    Hemoglobin 13.2 11.7 - 15.4 g/dL    Hematocrit 39.7 35.8 - 46.3 %    .8 82 - 102 FL    MCH 33.5 (H) 26.1 - 32.9 PG    MCHC 33.2 31.4 - 35.0 g/dL    RDW 13.3 11.9 - 14.6 %    Platelets 345 417 - 388 K/uL    MPV 9.7 9.4 - 12.3 FL    nRBC 0.00 0.0 - 0.2 K/uL    Differential Type AUTOMATED      Seg Neutrophils 82 (H) 43 - 78 %    Lymphocytes 9 (L) 13 - 44 %    Monocytes 6 4.0 - 12.0 %    Eosinophils % 1 0.5 - 7.8 %    Basophils 1 0.0 - 2.0 %    Immature Granulocytes 1 0.0 - 5.0 %    Segs Absolute 8.7 (H) 1.7 - 8.2 K/UL    Absolute Lymph # 1.0 0.5 - 4.6 K/UL    Absolute Mono # 0.6 0.1 - 1.3 K/UL    Absolute Eos # 0.1 0.0 - 0.8 K/UL    Basophils Absolute 0.1 0.0 - 0.2 K/UL    Absolute Immature Granulocyte 0.1 0.0 - 0.5 K/UL   Comprehensive Metabolic Panel    Collection Time: 12/19/22 10:22 PM   Result Value Ref Range    Sodium 135 133 - 143 mmol/L    Potassium 3.6 3.5 - 5.1 mmol/L    Chloride 104 101 - 110 mmol/L    CO2 26 21 - 32 mmol/L    Anion Gap 5 2 - 11 mmol/L    Glucose 103 (H) 65 - 100 mg/dL    BUN 15 8 - 23 MG/DL    Creatinine 0.60 0.6 - 1.0 MG/DL    Est, Glom Filt Rate >60 >60 ml/min/1.73m2    Calcium 9.2 8.3 - 10.4 MG/DL    Total Bilirubin 1.3 (H) 0.2 - 1.1 MG/DL    ALT 31 12 - 65 U/L    AST 35 15 - 37 U/L    Alk Phosphatase 71 50 - 136 U/L    Total Protein 7.5 6.3 - 8.2 g/dL    Albumin 3.9 3.2 - 4.6 g/dL    Globulin 3.6 2.8 - 4.5 g/dL    Albumin/Globulin Ratio 1.1 0.4 - 1.6       XR HIP RIGHT (2-3 VIEWS)   Final Result   Intertrochanteric right hip fracture         XR FEMUR RIGHT (MIN 2 VIEWS)   Final Result   Intertrochanteric right hip fracture      XR TIBIA FIBULA RIGHT (2 VIEWS)   Final Result   Somewhat limited views due to positioning. No definite acute   fracture      XR CHEST PORTABLE   Final Result   1.  2 small nodules in the right upper lung, most likely granulomas. 2.  Possible aortic aneurysm. Recommend CT to evaluate both the lung nodules and the aortic arch. Voice dictation software was used during the making of this note. This software is not perfect and grammatical and other typographical errors may be present. This note has not been completely proofread for errors.      Lata Hanson MD  12/19/22 0309       Lata Hanson MD  12/19/22 [id number]

## 2022-12-20 NOTE — PROGRESS NOTES
Occupational Therapy Note:    Attempted to see patient this AM for occupational therapy evaluation session. Patient admitted with R proximal femur fracture. Per ortho, plans for surgery today. . Will follow and re-attempt as schedule permits/patient available.  Thank you,    Latonya Rees, OT    [facility]

## 2022-12-20 NOTE — ACP (ADVANCE CARE PLANNING)
Advance Care Planning   Healthcare Decision Maker:  Perfecto Mauro, son has been asked to bring copy of HCPOA and LW for scanning    Click here to complete Healthcare Decision Makers including selection of the Healthcare Decision Maker Relationship (ie \"Primary\"). Today we documented Decision Maker(s) consistent with ACP documents on file.

## 2022-12-20 NOTE — CONSULTS
Consult    Patient: Reinaldo Deluna MRN: [id number]  SSN: [id number]    YOB: 1950  Age: 67 y.o. Sex: female      Subjective:      Reinaldo Deluna is a 67 y.o. female who fell after getting sort of tangled up with her dog and landed on her right hip. She is pretty healthy and active. She has very minimal medical history. She reports she walks with no assistive devices. She does not take any blood thinners. She is saying she is having some pain below her right knee in addition to her right hip. She does have a history of having what sounds like a tibial plateau fracture treated by Dr. Loli Garcia several years ago. Other than that she has no other complaints. .    Past Medical History:   Diagnosis Date    GERD (gastroesophageal reflux disease)     Hypertension      History reviewed. No pertinent surgical history.    FAMHX -No history of inflammatory arthritis   Social History     Tobacco Use    Smoking status: Never    Smokeless tobacco: Never   Substance Use Topics    Alcohol use: Never      Current Facility-Administered Medications   Medication Dose Route Frequency Provider Last Rate Last Admin    sodium chloride flush 0.9 % injection 5-40 mL  5-40 mL IntraVENous 2 times per day Michael Castro MD        sodium chloride flush 0.9 % injection 5-40 mL  5-40 mL IntraVENous PRN Leonard Bright MD        0.9 % sodium chloride infusion   IntraVENous PRN Leonard Bright MD        ondansetron (ZOFRAN-ODT) disintegrating tablet 4 mg  4 mg Oral Q8H PRN Leonard Bright MD        Or    ondansetron (ZOFRAN) injection 4 mg  4 mg IntraVENous Q6H PRN Leonard Bright MD        polyethylene glycol (GLYCOLAX) packet 17 g  17 g Oral Daily PRN Leonard Bright MD        acetaminophen (TYLENOL) tablet 650 mg  650 mg Oral Q6H PRN Leonard Bright MD        Or    acetaminophen (TYLENOL) suppository 650 mg  650 mg Rectal Q6H PRN Leonard Bright MD        0.9 % sodium chloride infusion   IntraVENous Continuous Leonard Bright  mL/hr at 12/20/22 0334 New Bag at 12/20/22 0334    atorvastatin (LIPITOR) tablet 10 mg  10 mg Oral Daily Leonard Bright MD        metoprolol succinate (TOPROL XL) extended release tablet 50 mg  50 mg Oral Daily Leonard Bright MD        oxyCODONE-acetaminophen (PERCOCET) 7.5-325 MG per tablet 1 tablet  1 tablet Oral Q6H PRN Leonard Brgiht MD        ceFAZolin (ANCEF) 2000 mg in sterile water 20 mL IV syringe  2,000 mg IntraVENous On Call to 1100 Surjit Lutz MD         Current Outpatient Medications   Medication Sig Dispense Refill    amLODIPine (NORVASC) 5 MG tablet Take 5 mg by mouth daily      atorvastatin (LIPITOR) 10 MG tablet Take 10 mg by mouth      oxyCODONE-acetaminophen (PERCOCET) 7.5-325 MG per tablet Take 1 tablet by mouth every 4 hours as needed. metoprolol succinate (TOPROL XL) 100 MG extended release tablet Take 50 mg by mouth daily          Allergies   Allergen Reactions    Penicillins Anaphylaxis and Swelling    [place] Anaphylaxis and Hives       Review of Systems:  A comprehensive review of systems was negative. Objective:     Vitals:    12/20/22 0500 12/20/22 0515 12/20/22 0530 12/20/22 0546   BP: 97/63 99/69 95/68 109/67   Pulse: 53 60 54 62   Resp: 12 12 14 20   Temp:       TempSrc:       SpO2:       Weight:       Height:            Physical Exam:  Physical Exam:  General:  Alert, cooperative, no distress, appears stated age. Orientation she is alert and oriented to person place time and situation   Eyes:  Conjunctivae/corneas clear. PERRL, EOMs intact. Fundi benign   Ears:  Normal TMs and external ear canals both ears. Nose: Nares normal. Septum midline. Mucosa normal. No drainage or sinus tenderness. Mouth/Throat: Lips, mucosa, and tongue normal. Teeth and gums normal.   Neck: Supple, symmetrical, trachea midline, no adenopathy, thyroid: no enlargment/tenderness/nodules, no carotid bruit and no JVD. Back:   Symmetric, no curvature. ROM normal. No CVA tenderness.    Lungs:   Clear to auscultation bilaterally. Heart:  Regular rate and rhythm, S1, S2 normal, no murmur, click, rub or gallop. Abdomen:   Soft, non-tender. Bowel sounds normal. No masses,  No organomegaly. No lymphadenopathy in all 4 extremities  Alignment- pt has some obvious deformity of the right hip  Range of motion- with any range of motion right hip  Vascular-distal pulses palpable in right lower extremity  Sensory/motor-deep tendon reflexes normal right lower extremity. Motor and sensory function intact. Stability- is difficult to assess stability because of the presence of the fracture  Tenderness to palpation over the right greater trochanter area  Skin- no rashes ulcerations or open wounds right lower extremity  Gait-pt cannot put any weight on the right lower extremity      Assessment:     Hospital Problems             Last Modified POA    * (Principal) Closed right hip fracture, initial encounter ([facility] [address] 12/20/2022 Yes    Hypercholesterolemia 12/20/2022 Yes    Overview Signed 12/20/2022  1:38 AM by Annemarie Vega MD     Formatting of this note might be different from the original.  HDL goal greater than 40; LDL goal less than 70    Last Assessment & Plan:   Formatting of this note might be different from the original.  HDL was 97, LDL was 18 on 7/15/2022. This is well controlled continue current regimen. Essential hypertension 12/20/2022 Yes    Overview Signed 12/20/2022  1:38 AM by Annemarie Vega MD     Last Assessment & Plan:   Formatting of this note might be different from the original.  We encouraged her that her blood pressure readings are good and informed her of the healthy ranges for good blood pressure. Pt asked if her heart rate was too low, we stated that her heart rate was good and decreasing her Norvasc could raise it a little. Today, her blood pressure is 103/67. She was decreased on her Norvasc to 5 mg. This is instead of taking 10 mg a few days a week.  I will reevaluate in 4 months Closed displaced right intertrochanteric/peritrochanteric proximal femur fracture    Xrays and or studies:    I have reviewed her x-rays which show a displaced mildly comminuted right intertrochanteric proximal femur fracture  Plan:     I have spoken with the patient at length regarding different treatment options. I try to make sure she understands I do not think any sort of nonoperative treatment really is appropriate. I do think the most reasonable thing would be to treat this operatively with intramedullary nail fixation. I do some illustrations to help her understand this and try to go through with her the exact nature of the procedure explained to her what the surgery would involve and where her incisions would be. After speaking with her about this as well as a detailed list material risk associated with the procedure she seemed to feel comfortable consenting.   The plan will be to proceed with open treatment of right proximal femur fracture with intramedullary nail fixation hopefully today in the operating room pending OR availability    Signed By: Marianne Bates MD

## 2022-12-20 NOTE — INTERVAL H&P NOTE
Update History & Physical    The Patient's History and Physical of 12/20/2022 was reviewed with the patient and I examined the patient. There was no change. The surgical site was confirmed by the patient and me. Plan:  The risk, benefits, expected outcome, and alternative to the recommended procedure have been discussed with the patient. Patient understands and wants to proceed with open treatment of right proximal femur fracture with intramedullary nail fixation.     Electronically signed by Siddhartha Molina MD on 12/20/2022 at 7:12 AM

## 2022-12-20 NOTE — PROGRESS NOTES
There is no OR time available today for the patient's hip fracture. I think the most reasonable thing after speaking with the patient is to just allow her to go ahead and eat now and get hopefully admitted and get her in a normal room and we will try to do her first thing in the morning.   The plan will be to make her n.p.o. after midnight tonight and she would be treated operatively hopefully first case tomorrow morning the 21st

## 2022-12-20 NOTE — PLAN OF CARE
Problem: Discharge Planning  Goal: Discharge to home or other facility with appropriate resources  Outcome: Progressing     Problem: Pain  Goal: Verbalizes/displays adequate comfort level or baseline comfort level  Outcome: Progressing  Flowsheets (Taken 12/20/2022 1420)  Verbalizes/displays adequate comfort level or baseline comfort level:   Encourage patient to monitor pain and request assistance   Assess pain using appropriate pain scale     Problem: Skin/Tissue Integrity  Goal: Absence of new skin breakdown  Description: 1. Monitor for areas of redness and/or skin breakdown  2. Assess vascular access sites hourly  3. Every 4-6 hours minimum:  Change oxygen saturation probe site  4. Every 4-6 hours:  If on nasal continuous positive airway pressure, respiratory therapy assess nares and determine need for appliance change or resting period.   Outcome: Progressing     Problem: Safety - Adult  Goal: Free from fall injury  Outcome: Progressing     Problem: ABCDS Injury Assessment  Goal: Absence of physical injury  Outcome: Progressing

## 2022-12-20 NOTE — CARE COORDINATION
Chart review complete, CM met with pt and son Israel Alatorre [facility]) at bedside, pt had fall prior to admission and now awaiting surgery by Ortho, CM spoke with pt about possible STR at DC and pt has declined states she plans to go home her spouse will be at home and her son is available 24/7 to assist with care, pt has DME in home and will not need any additional DME for home. CM spoke with pt about HH and will make decision closer to dc date. Demographics, insurance and PCP confirmed. CM staff will remain available to assist as needed with dc needs. PT/OT pending evaluations, surgery planned for today. 12/20/22 3116   Service Assessment   Patient Orientation Alert and Oriented   Cognition Alert   History Provided By Patient   Primary Caregiver Self   Accompanied By/Relationship son Inocencia Capellan is: Named in [address] [place] [address]  (Son Giuliana Hutchinson [phone] [facility])   PCP Verified by CM Yes  Martha Schneider MD last visit 2mths ago)   Last Visit to PCP Within last 3 months   Prior Functional Level Independent in ADLs/IADLs   Current Functional Level Independent in ADLs/IADLs   Can patient return to prior living arrangement Yes   Ability to make needs known: Good   Family able to assist with home care needs: Yes   Would you like for me to discuss the discharge plan with any other family members/significant others, and if so, who? No   Financial Resources Medicare; ([place])   Community Resources None   CM/REGULO Referral Other (see comment)  (dispo)   Social/Functional History   Lives With Spouse   Type of Home House   Home Layout Two level   Home Access Stairs to enter with rails   Entrance Stairs - Number of Steps 5   Bathroom Shower/Tub Tub/Shower unit   Bathroom Toilet Standard   Home Equipment Cane;Walker, standard; Wheelchair-manual   ADL Assistance Independent   Ambulation Assistance Independent   Transfer Assistance Independent   Active  Yes   Mode of Transportation Car   Occupation Retired   Discharge Planning   Type of [address] Prior To Admission None   [address]   DME Ordered? No   Potential Assistance Purchasing Medications No   Type of Home Care Services None   Patient expects to be discharged to: House   One/Two Story Residence Two story   # of Interior Steps 4   History of falls? 1   Services At/After Discharge   Transition of Care Consult (CM Consult) [address] At/After Discharge PT;OT;Nursing services   Condition of Participation: Discharge Planning   The Plan for Transition of Care is related to the following treatment goals: pt requests to dc home will arrange home care to continue mobility training   The Patient and/or Patient Representative was provided with a Choice of Provider? Patient   The Patient and/Or Patient Representative agree with the Discharge Plan? Yes   Freedom of Choice list was provided with basic dialogue that supports the patient's individualized plan of care/goals, treatment preferences, and shares the quality data associated with the providers?   Yes

## 2022-12-21 ENCOUNTER — ANESTHESIA (OUTPATIENT)
Dept: SURGERY | Age: 72
DRG: 481 | End: 2022-12-21
Payer: MEDICARE

## 2022-12-21 ENCOUNTER — APPOINTMENT (OUTPATIENT)
Dept: GENERAL RADIOLOGY | Age: 72
DRG: 481 | End: 2022-12-21
Payer: MEDICARE

## 2022-12-21 LAB
ALBUMIN SERPL-MCNC: 3 G/DL (ref 3.2–4.6)
ALBUMIN/GLOB SERPL: 1 {RATIO} (ref 0.4–1.6)
ALP SERPL-CCNC: 55 U/L (ref 50–136)
ALT SERPL-CCNC: 18 U/L (ref 12–65)
ANION GAP SERPL CALC-SCNC: 1 MMOL/L (ref 2–11)
AST SERPL-CCNC: 15 U/L (ref 15–37)
BASOPHILS # BLD: 0 K/UL (ref 0–0.2)
BASOPHILS NFR BLD: 1 % (ref 0–2)
BILIRUB SERPL-MCNC: 1.1 MG/DL (ref 0.2–1.1)
BUN SERPL-MCNC: 12 MG/DL (ref 8–23)
CALCIUM SERPL-MCNC: 8.2 MG/DL (ref 8.3–10.4)
CHLORIDE SERPL-SCNC: 108 MMOL/L (ref 101–110)
CO2 SERPL-SCNC: 30 MMOL/L (ref 21–32)
CREAT SERPL-MCNC: 0.5 MG/DL (ref 0.6–1)
DIFFERENTIAL METHOD BLD: ABNORMAL
EOSINOPHIL # BLD: 0.1 K/UL (ref 0–0.8)
EOSINOPHIL NFR BLD: 2 % (ref 0.5–7.8)
ERYTHROCYTE [DISTWIDTH] IN BLOOD BY AUTOMATED COUNT: 13.7 % (ref 11.9–14.6)
GLOBULIN SER CALC-MCNC: 3.1 G/DL (ref 2.8–4.5)
GLUCOSE SERPL-MCNC: 105 MG/DL (ref 65–100)
HCT VFR BLD AUTO: 31.8 % (ref 35.8–46.3)
HGB BLD-MCNC: 10.2 G/DL (ref 11.7–15.4)
IMM GRANULOCYTES # BLD AUTO: 0 K/UL (ref 0–0.5)
IMM GRANULOCYTES NFR BLD AUTO: 0 % (ref 0–5)
LYMPHOCYTES # BLD: 1.4 K/UL (ref 0.5–4.6)
LYMPHOCYTES NFR BLD: 22 % (ref 13–44)
MCH RBC QN AUTO: 34.5 PG (ref 26.1–32.9)
MCHC RBC AUTO-ENTMCNC: 32.1 G/DL (ref 31.4–35)
MCV RBC AUTO: 107.4 FL (ref 82–102)
MONOCYTES # BLD: 0.9 K/UL (ref 0.1–1.3)
MONOCYTES NFR BLD: 14 % (ref 4–12)
NEUTS SEG # BLD: 4.1 K/UL (ref 1.7–8.2)
NEUTS SEG NFR BLD: 62 % (ref 43–78)
NRBC # BLD: 0 K/UL (ref 0–0.2)
PLATELET # BLD AUTO: 187 K/UL (ref 150–450)
PMV BLD AUTO: 10 FL (ref 9.4–12.3)
POTASSIUM SERPL-SCNC: 3.7 MMOL/L (ref 3.5–5.1)
PROT SERPL-MCNC: 6.1 G/DL (ref 6.3–8.2)
RBC # BLD AUTO: 2.96 M/UL (ref 4.05–5.2)
SODIUM SERPL-SCNC: 139 MMOL/L (ref 133–143)
WBC # BLD AUTO: 6.6 K/UL (ref 4.3–11.1)

## 2022-12-21 PROCEDURE — 3600000004 HC SURGERY LEVEL 4 BASE: Performed by: ORTHOPAEDIC SURGERY

## 2022-12-21 PROCEDURE — 2500000003 HC RX 250 WO HCPCS: Performed by: NURSE ANESTHETIST, CERTIFIED REGISTERED

## 2022-12-21 PROCEDURE — 7100000000 HC PACU RECOVERY - FIRST 15 MIN: Performed by: ORTHOPAEDIC SURGERY

## 2022-12-21 PROCEDURE — 1100000000 HC RM PRIVATE

## 2022-12-21 PROCEDURE — C1769 GUIDE WIRE: HCPCS | Performed by: ORTHOPAEDIC SURGERY

## 2022-12-21 PROCEDURE — 2580000003 HC RX 258: Performed by: ANESTHESIOLOGY

## 2022-12-21 PROCEDURE — 6360000002 HC RX W HCPCS: Performed by: ORTHOPAEDIC SURGERY

## 2022-12-21 PROCEDURE — 3700000000 HC ANESTHESIA ATTENDED CARE: Performed by: ORTHOPAEDIC SURGERY

## 2022-12-21 PROCEDURE — 2580000003 HC RX 258: Performed by: HOSPITALIST

## 2022-12-21 PROCEDURE — 2720000010 HC SURG SUPPLY STERILE: Performed by: ORTHOPAEDIC SURGERY

## 2022-12-21 PROCEDURE — 80053 COMPREHEN METABOLIC PANEL: CPT

## 2022-12-21 PROCEDURE — 2709999900 HC NON-CHARGEABLE SUPPLY: Performed by: ORTHOPAEDIC SURGERY

## 2022-12-21 PROCEDURE — 2700000000 HC OXYGEN THERAPY PER DAY

## 2022-12-21 PROCEDURE — 85025 COMPLETE CBC W/AUTO DIFF WBC: CPT

## 2022-12-21 PROCEDURE — 3600000014 HC SURGERY LEVEL 4 ADDTL 15MIN: Performed by: ORTHOPAEDIC SURGERY

## 2022-12-21 PROCEDURE — 6360000002 HC RX W HCPCS: Performed by: NURSE ANESTHETIST, CERTIFIED REGISTERED

## 2022-12-21 PROCEDURE — 6360000002 HC RX W HCPCS: Performed by: FAMILY MEDICINE

## 2022-12-21 PROCEDURE — 0QS634Z REPOSITION RIGHT UPPER FEMUR WITH INTERNAL FIXATION DEVICE, PERCUTANEOUS APPROACH: ICD-10-PCS | Performed by: ORTHOPAEDIC SURGERY

## 2022-12-21 PROCEDURE — 36415 COLL VENOUS BLD VENIPUNCTURE: CPT

## 2022-12-21 PROCEDURE — 94760 N-INVAS EAR/PLS OXIMETRY 1: CPT

## 2022-12-21 PROCEDURE — 6370000000 HC RX 637 (ALT 250 FOR IP): Performed by: HOSPITALIST

## 2022-12-21 PROCEDURE — C1713 ANCHOR/SCREW BN/BN,TIS/BN: HCPCS | Performed by: ORTHOPAEDIC SURGERY

## 2022-12-21 PROCEDURE — 7100000001 HC PACU RECOVERY - ADDTL 15 MIN: Performed by: ORTHOPAEDIC SURGERY

## 2022-12-21 PROCEDURE — 6370000000 HC RX 637 (ALT 250 FOR IP): Performed by: ANESTHESIOLOGY

## 2022-12-21 PROCEDURE — 3700000001 HC ADD 15 MINUTES (ANESTHESIA): Performed by: ORTHOPAEDIC SURGERY

## 2022-12-21 PROCEDURE — 2580000003 HC RX 258: Performed by: ORTHOPAEDIC SURGERY

## 2022-12-21 PROCEDURE — 73502 X-RAY EXAM HIP UNI 2-3 VIEWS: CPT

## 2022-12-21 DEVICE — LAG SCREW, TI
Type: IMPLANTABLE DEVICE | Site: HIP | Status: FUNCTIONAL
Brand: GAMMA

## 2022-12-21 DEVICE — LOCKING SCREW, FULLY THREADED: Type: IMPLANTABLE DEVICE | Site: HIP | Status: FUNCTIONAL

## 2022-12-21 DEVICE — LONG NAIL KIT R1.5, TI, RIGHT
Type: IMPLANTABLE DEVICE | Site: HIP | Status: FUNCTIONAL
Brand: GAMMA

## 2022-12-21 RX ORDER — MIDAZOLAM HYDROCHLORIDE 2 MG/2ML
2 INJECTION, SOLUTION INTRAMUSCULAR; INTRAVENOUS
Status: DISCONTINUED | OUTPATIENT
Start: 2022-12-21 | End: 2022-12-21 | Stop reason: HOSPADM

## 2022-12-21 RX ORDER — SODIUM CHLORIDE 9 MG/ML
INJECTION, SOLUTION INTRAVENOUS PRN
Status: CANCELLED | OUTPATIENT
Start: 2022-12-21

## 2022-12-21 RX ORDER — PROPOFOL 10 MG/ML
INJECTION, EMULSION INTRAVENOUS PRN
Status: DISCONTINUED | OUTPATIENT
Start: 2022-12-21 | End: 2022-12-21 | Stop reason: SDUPTHER

## 2022-12-21 RX ORDER — DEXAMETHASONE SODIUM PHOSPHATE 10 MG/ML
INJECTION INTRAMUSCULAR; INTRAVENOUS PRN
Status: DISCONTINUED | OUTPATIENT
Start: 2022-12-21 | End: 2022-12-21 | Stop reason: SDUPTHER

## 2022-12-21 RX ORDER — PHENYLEPHRINE HYDROCHLORIDE 10 MG/ML
INJECTION INTRAVENOUS PRN
Status: DISCONTINUED | OUTPATIENT
Start: 2022-12-21 | End: 2022-12-21 | Stop reason: SDUPTHER

## 2022-12-21 RX ORDER — SODIUM CHLORIDE, SODIUM LACTATE, POTASSIUM CHLORIDE, CALCIUM CHLORIDE 600; 310; 30; 20 MG/100ML; MG/100ML; MG/100ML; MG/100ML
INJECTION, SOLUTION INTRAVENOUS CONTINUOUS
Status: DISCONTINUED | OUTPATIENT
Start: 2022-12-21 | End: 2022-12-21 | Stop reason: HOSPADM

## 2022-12-21 RX ORDER — HYDROMORPHONE HYDROCHLORIDE 1 MG/ML
0.5 INJECTION, SOLUTION INTRAMUSCULAR; INTRAVENOUS; SUBCUTANEOUS
Status: DISCONTINUED | OUTPATIENT
Start: 2022-12-21 | End: 2022-12-23 | Stop reason: HOSPADM

## 2022-12-21 RX ORDER — PROCHLORPERAZINE EDISYLATE 5 MG/ML
5 INJECTION INTRAMUSCULAR; INTRAVENOUS
Status: DISCONTINUED | OUTPATIENT
Start: 2022-12-21 | End: 2022-12-21 | Stop reason: HOSPADM

## 2022-12-21 RX ORDER — ACETAMINOPHEN 500 MG
1000 TABLET ORAL ONCE
Status: COMPLETED | OUTPATIENT
Start: 2022-12-21 | End: 2022-12-21

## 2022-12-21 RX ORDER — ONDANSETRON 2 MG/ML
4 INJECTION INTRAMUSCULAR; INTRAVENOUS
Status: DISCONTINUED | OUTPATIENT
Start: 2022-12-21 | End: 2022-12-21 | Stop reason: HOSPADM

## 2022-12-21 RX ORDER — SODIUM CHLORIDE 0.9 % (FLUSH) 0.9 %
5-40 SYRINGE (ML) INJECTION EVERY 12 HOURS SCHEDULED
Status: DISCONTINUED | OUTPATIENT
Start: 2022-12-21 | End: 2022-12-23 | Stop reason: HOSPADM

## 2022-12-21 RX ORDER — SODIUM CHLORIDE 9 MG/ML
INJECTION, SOLUTION INTRAVENOUS PRN
Status: DISCONTINUED | OUTPATIENT
Start: 2022-12-21 | End: 2022-12-21 | Stop reason: HOSPADM

## 2022-12-21 RX ORDER — ONDANSETRON 2 MG/ML
4 INJECTION INTRAMUSCULAR; INTRAVENOUS EVERY 6 HOURS PRN
Status: DISCONTINUED | OUTPATIENT
Start: 2022-12-21 | End: 2022-12-23 | Stop reason: HOSPADM

## 2022-12-21 RX ORDER — SODIUM CHLORIDE 0.9 % (FLUSH) 0.9 %
5-40 SYRINGE (ML) INJECTION PRN
Status: DISCONTINUED | OUTPATIENT
Start: 2022-12-21 | End: 2022-12-21 | Stop reason: HOSPADM

## 2022-12-21 RX ORDER — LIDOCAINE HYDROCHLORIDE 20 MG/ML
INJECTION, SOLUTION EPIDURAL; INFILTRATION; INTRACAUDAL; PERINEURAL PRN
Status: DISCONTINUED | OUTPATIENT
Start: 2022-12-21 | End: 2022-12-21 | Stop reason: SDUPTHER

## 2022-12-21 RX ORDER — LIDOCAINE HYDROCHLORIDE 10 MG/ML
1 INJECTION, SOLUTION INFILTRATION; PERINEURAL
Status: DISCONTINUED | OUTPATIENT
Start: 2022-12-21 | End: 2022-12-21 | Stop reason: HOSPADM

## 2022-12-21 RX ORDER — HYDROMORPHONE HCL 110MG/55ML
0.25 PATIENT CONTROLLED ANALGESIA SYRINGE INTRAVENOUS EVERY 5 MIN PRN
Status: DISCONTINUED | OUTPATIENT
Start: 2022-12-21 | End: 2022-12-21 | Stop reason: HOSPADM

## 2022-12-21 RX ORDER — SODIUM CHLORIDE 0.9 % (FLUSH) 0.9 %
5-40 SYRINGE (ML) INJECTION PRN
Status: DISCONTINUED | OUTPATIENT
Start: 2022-12-21 | End: 2022-12-23 | Stop reason: HOSPADM

## 2022-12-21 RX ORDER — ONDANSETRON 4 MG/1
4 TABLET, ORALLY DISINTEGRATING ORAL EVERY 8 HOURS PRN
Status: DISCONTINUED | OUTPATIENT
Start: 2022-12-21 | End: 2022-12-23 | Stop reason: HOSPADM

## 2022-12-21 RX ORDER — ONDANSETRON 2 MG/ML
INJECTION INTRAMUSCULAR; INTRAVENOUS PRN
Status: DISCONTINUED | OUTPATIENT
Start: 2022-12-21 | End: 2022-12-21 | Stop reason: SDUPTHER

## 2022-12-21 RX ORDER — OXYCODONE HYDROCHLORIDE 5 MG/1
10 TABLET ORAL EVERY 4 HOURS PRN
Status: DISCONTINUED | OUTPATIENT
Start: 2022-12-21 | End: 2022-12-23 | Stop reason: HOSPADM

## 2022-12-21 RX ORDER — OXYCODONE HYDROCHLORIDE 5 MG/1
5 TABLET ORAL PRN
Status: DISCONTINUED | OUTPATIENT
Start: 2022-12-21 | End: 2022-12-21 | Stop reason: HOSPADM

## 2022-12-21 RX ORDER — DIPHENHYDRAMINE HYDROCHLORIDE 50 MG/ML
12.5 INJECTION INTRAMUSCULAR; INTRAVENOUS
Status: DISCONTINUED | OUTPATIENT
Start: 2022-12-21 | End: 2022-12-21 | Stop reason: HOSPADM

## 2022-12-21 RX ORDER — OXYCODONE HYDROCHLORIDE 5 MG/1
10 TABLET ORAL PRN
Status: DISCONTINUED | OUTPATIENT
Start: 2022-12-21 | End: 2022-12-21 | Stop reason: HOSPADM

## 2022-12-21 RX ORDER — EPHEDRINE SULFATE/0.9% NACL/PF 50 MG/5 ML
SYRINGE (ML) INTRAVENOUS PRN
Status: DISCONTINUED | OUTPATIENT
Start: 2022-12-21 | End: 2022-12-21 | Stop reason: SDUPTHER

## 2022-12-21 RX ORDER — HYDROMORPHONE HCL 110MG/55ML
0.5 PATIENT CONTROLLED ANALGESIA SYRINGE INTRAVENOUS EVERY 5 MIN PRN
Status: DISCONTINUED | OUTPATIENT
Start: 2022-12-21 | End: 2022-12-21 | Stop reason: HOSPADM

## 2022-12-21 RX ORDER — LANOLIN ALCOHOL/MO/W.PET/CERES
6 CREAM (GRAM) TOPICAL NIGHTLY PRN
Status: DISCONTINUED | OUTPATIENT
Start: 2022-12-21 | End: 2022-12-23 | Stop reason: HOSPADM

## 2022-12-21 RX ORDER — SODIUM CHLORIDE 0.9 % (FLUSH) 0.9 %
5-40 SYRINGE (ML) INJECTION EVERY 12 HOURS SCHEDULED
Status: DISCONTINUED | OUTPATIENT
Start: 2022-12-21 | End: 2022-12-21 | Stop reason: HOSPADM

## 2022-12-21 RX ORDER — FENTANYL CITRATE 50 UG/ML
INJECTION, SOLUTION INTRAMUSCULAR; INTRAVENOUS PRN
Status: DISCONTINUED | OUTPATIENT
Start: 2022-12-21 | End: 2022-12-21 | Stop reason: SDUPTHER

## 2022-12-21 RX ADMIN — SODIUM CHLORIDE, PRESERVATIVE FREE 5 ML: 5 INJECTION INTRAVENOUS at 22:17

## 2022-12-21 RX ADMIN — Medication 2 G: at 07:09

## 2022-12-21 RX ADMIN — Medication 6 MG: at 21:50

## 2022-12-21 RX ADMIN — SODIUM CHLORIDE, PRESERVATIVE FREE 5 ML: 5 INJECTION INTRAVENOUS at 10:49

## 2022-12-21 RX ADMIN — FENTANYL CITRATE 25 MCG: 50 INJECTION, SOLUTION INTRAMUSCULAR; INTRAVENOUS at 08:03

## 2022-12-21 RX ADMIN — FENTANYL CITRATE 50 MCG: 50 INJECTION, SOLUTION INTRAMUSCULAR; INTRAVENOUS at 07:34

## 2022-12-21 RX ADMIN — SODIUM CHLORIDE, PRESERVATIVE FREE 5 ML: 5 INJECTION INTRAVENOUS at 09:56

## 2022-12-21 RX ADMIN — PHENYLEPHRINE HYDROCHLORIDE 100 MCG: 10 INJECTION INTRAVENOUS at 07:17

## 2022-12-21 RX ADMIN — Medication 10 MG: at 07:50

## 2022-12-21 RX ADMIN — PHENYLEPHRINE HYDROCHLORIDE 100 MCG: 10 INJECTION INTRAVENOUS at 07:23

## 2022-12-21 RX ADMIN — SODIUM CHLORIDE: 9 INJECTION, SOLUTION INTRAVENOUS at 18:00

## 2022-12-21 RX ADMIN — ENOXAPARIN SODIUM 40 MG: 100 INJECTION SUBCUTANEOUS at 14:32

## 2022-12-21 RX ADMIN — PROPOFOL 20 MG: 10 INJECTION, EMULSION INTRAVENOUS at 07:19

## 2022-12-21 RX ADMIN — ACETAMINOPHEN 1000 MG: 500 TABLET ORAL at 06:14

## 2022-12-21 RX ADMIN — SODIUM CHLORIDE: 9 INJECTION, SOLUTION INTRAVENOUS at 09:55

## 2022-12-21 RX ADMIN — Medication 2000 MG: at 14:32

## 2022-12-21 RX ADMIN — ATORVASTATIN CALCIUM 10 MG: 10 TABLET, FILM COATED ORAL at 09:55

## 2022-12-21 RX ADMIN — SODIUM CHLORIDE, POTASSIUM CHLORIDE, SODIUM LACTATE AND CALCIUM CHLORIDE: 600; 310; 30; 20 INJECTION, SOLUTION INTRAVENOUS at 05:48

## 2022-12-21 RX ADMIN — PROPOFOL 150 MG: 10 INJECTION, EMULSION INTRAVENOUS at 07:17

## 2022-12-21 RX ADMIN — PROPOFOL 20 MG: 10 INJECTION, EMULSION INTRAVENOUS at 07:34

## 2022-12-21 RX ADMIN — ONDANSETRON 4 MG: 2 INJECTION INTRAMUSCULAR; INTRAVENOUS at 07:36

## 2022-12-21 RX ADMIN — DEXAMETHASONE SODIUM PHOSPHATE 10 MG: 10 INJECTION INTRAMUSCULAR; INTRAVENOUS at 07:36

## 2022-12-21 RX ADMIN — FENTANYL CITRATE 25 MCG: 50 INJECTION, SOLUTION INTRAMUSCULAR; INTRAVENOUS at 07:17

## 2022-12-21 RX ADMIN — Medication 2000 MG: at 22:50

## 2022-12-21 RX ADMIN — Medication 10 MG: at 07:32

## 2022-12-21 RX ADMIN — LIDOCAINE HYDROCHLORIDE 100 MG: 20 INJECTION, SOLUTION EPIDURAL; INFILTRATION; INTRACAUDAL; PERINEURAL at 07:17

## 2022-12-21 ASSESSMENT — PAIN SCALES - GENERAL
PAINLEVEL_OUTOF10: 0
PAINLEVEL_OUTOF10: 0
PAINLEVEL_OUTOF10: 2

## 2022-12-21 ASSESSMENT — PAIN DESCRIPTION - ORIENTATION: ORIENTATION: RIGHT

## 2022-12-21 ASSESSMENT — PAIN DESCRIPTION - LOCATION: LOCATION: LEG

## 2022-12-21 ASSESSMENT — PAIN - FUNCTIONAL ASSESSMENT: PAIN_FUNCTIONAL_ASSESSMENT: 0-10

## 2022-12-21 NOTE — ANESTHESIA POSTPROCEDURE EVALUATION
Department of Anesthesiology  Postprocedure Note    Patient: Bárbara Melgoza  MRN: [id number]  YOB: 1950  Date of evaluation: 12/21/2022      Procedure Summary     Date: 12/21/22 Room / Location:  MAIN OR 10 / D MAIN OR    Anesthesia Start: 9298 Anesthesia Stop: 7380    Procedure: RIGHT GAMMA NAIL  INSERTION (Right: Hip) Diagnosis:       Closed fracture of right hip, initial encounter ([facility] [address]      (Closed fracture of right hip, initial encounter ([facility] [address] [S72.001A])    Providers: Yusuf Cantu MD Responsible Provider: Yamile Hunter MD    Anesthesia Type: general ASA Status: 3          Anesthesia Type: No value filed.     Juan Ramon Phase I: Juan Ramon Score: 8    Juan Ramon Phase II: Juan Ramon Score: 9      Anesthesia Post Evaluation    Patient location during evaluation: PACU  Patient participation: complete - patient participated  Level of consciousness: awake  Airway patency: patent  Nausea & Vomiting: no nausea  Complications: no  Cardiovascular status: blood pressure returned to baseline  Respiratory status: acceptable  Hydration status: euvolemic  Multimodal analgesia pain management approach

## 2022-12-21 NOTE — INTERVAL H&P NOTE
Update History & Physical      The Patient's History and Physical of 12/20/2022 was reviewed with the patient and I examined the patient. There was no change. The surgical site was confirmed by the patient and me. Plan:  The risk, benefits, expected outcome, and alternative to the recommended procedure have been discussed with the patient. Patient understands and wants to proceed with open treatment of right proximal femur fracture with intramedullary nail fixation.     Electronically signed by Everardo Boyd MD on 12/21/2022 at 6:54 AM

## 2022-12-21 NOTE — PLAN OF CARE
Problem: Discharge Planning  Goal: Discharge to home or other facility with appropriate resources  12/21/2022 1004 by Shelby Stiles RN  Outcome: Progressing  12/20/2022 2117 by Lisa Hernandez RN  Outcome: Progressing  Flowsheets (Taken 12/20/2022 1940)  Discharge to home or other facility with appropriate resources: Identify barriers to discharge with patient and caregiver     Problem: Pain  Goal: Verbalizes/displays adequate comfort level or baseline comfort level  12/21/2022 1004 by Shelby Stiles RN  Outcome: Progressing  12/20/2022 2117 by Lisa Hernandez RN  Outcome: Progressing     Problem: Safety - Adult  Goal: Free from fall injury  12/20/2022 2117 by Lisa Hernandez RN  Outcome: Progressing  Flowsheets (Taken 12/20/2022 1940)  Free From Fall Injury: Instruct family/caregiver on patient safety

## 2022-12-21 NOTE — PROGRESS NOTES
Hospitalist Progress Note   Admit Date:  2022  8:13 PM   Name:  Janay Love   Age:  67 y.o. Sex:  female  :  1950   MRN:  [id number]   Room:  [facility]/    Presenting Complaint: Fall     Reason(s) for Admission: Fall, initial encounter [W19. XXXA]  Closed right hip fracture, initial encounter ([facility] [address] [S72.001A]  Closed intertrochanteric fracture of hip, right, initial encounter [facility]) [address] Course:   66-year-old female with history of HTN presented to the ED after due to a fall after she tripped over by her dog and landed on her right hip. In the ER, XR right hip shows intertrochanteric right hip fracture. Orthopedic surgery was consulted and pt underwent right gamma nail insertion procedure on . Subjective & 24hr Events (22): Patient was alert and oriented x3 this morning. Son at bedside. Saw patient after she underwent ORIF. She stated that she feels well. Pain is controlled. She adamantly refused short-term rehab. Wants to go home to be with her . Denies fever, chills, SOB, chest pain, abdominal pain, nausea, vomiting. Assessment & Plan:     Closed right hip fracture, initial encounter   2/2 mechanical fall. XR right hip shows intertrochanteric right hip fracture. S/p ORIF on   Follow Hgb&Hct post-op  PT/OT  Analgesics prn  DVT ppx per primary  Ortho on board, appreciate recs    Hypercholesterolemia  Cont statin    Essential hypertension  Hold home metoprolol to limit hypotension      Anticipated discharge needs:      1-2 days pending stable hgb, f/u Ortho recs. PT/OT to eval    Diet:  ADULT DIET; Regular  DVT PPx: Lovenox SQ  Code status: Full Code    Hospital Problems:  Principal Problem:    Closed right hip fracture, initial encounter [facility])  Active Problems:    Hypercholesterolemia    Essential hypertension  Resolved Problems:    * No resolved hospital problems.  *      Objective:   Patient Vitals for the past 24 hrs:   Temp Pulse Resp BP SpO2   12/21/22 0845 98.2 °F (36.8 °C) 83 20 108/60 95 %   12/21/22 0840 -- 74 20 111/62 96 %   12/21/22 0835 -- 75 18 109/61 96 %   12/21/22 0830 -- 76 18 111/65 95 %   12/21/22 0825 -- 75 18 108/63 92 %   12/21/22 0820 -- 78 16 (!) 119/58 94 %   12/21/22 0815 -- 78 16 132/61 95 %   12/21/22 0813 -- 76 16 132/61 98 %   12/21/22 0811 98.3 °F (36.8 °C) 82 16 (!) 143/59 95 %   12/21/22 0810 -- -- 16 -- --   12/21/22 0803 98.4 °F (36.9 °C) 84 15 (!) 197/79 95 %   12/21/22 0541 100 °F (37.8 °C) 72 16 124/73 97 %   12/21/22 0525 -- 86 16 126/61 --   12/21/22 0430 98.9 °F (37.2 °C) 77 18 108/79 92 %   12/20/22 2332 98.6 °F (37 °C) 75 18 112/69 91 %   12/20/22 1952 99 °F (37.2 °C) 76 18 107/62 91 %   12/20/22 1420 98.1 °F (36.7 °C) 64 16 129/77 93 %   12/20/22 1145 -- 62 27 -- --   12/20/22 1130 -- 57 15 -- --   12/20/22 1115 -- 58 14 -- --       Oxygen Therapy  SpO2: 95 %  Pulse via Oximetry: 83 beats per minute  Pulse Oximeter Device Mode: Continuous  Pulse Oximeter Device Location: Finger  O2 Device: Nasal cannula  O2 Flow Rate (L/min): 2 L/min    Estimated body mass index is 21.97 kg/m² as calculated from the following:    Height as of this encounter: 5' 4\" (1.626 m). Weight as of this encounter: 128 lb (58.1 kg). Intake/Output Summary (Last 24 hours) at 12/21/2022 1103  Last data filed at 12/21/2022 1034  Gross per 24 hour   Intake 1620 ml   Output 1300 ml   Net 320 ml         Physical Exam:     Blood pressure 108/60, pulse 83, temperature 98.2 °F (36.8 °C), temperature source Infrared, resp. rate 20, height 5' 4\" (1.626 m), weight 128 lb (58.1 kg), SpO2 95 %. General:    Well nourished. Head:  Normocephalic, atraumatic  Eyes:  Sclerae appear normal.  Pupils equally round. ENT:  Nares appear normal, no drainage. Moist oral mucosa  Neck:  No restricted ROM. Trachea midline   CV:   RRR. No m/r/g. No jugular venous distension. Lungs:   CTAB. No wheezing, rhonchi, or rales. Symmetric expansion.   Abdomen: Bowel sounds present. Soft, nontender, nondistended. Extremities: No cyanosis or clubbing. No edema. R hip dressing c/d/i  Skin:     No rashes and normal coloration. Warm and dry. Neuro:  CN II-XII grossly intact. Sensation intact. A&Ox3  Psych:  Normal mood and affect.       I have personally reviewed labs and tests showing:  Recent Labs:  Recent Results (from the past 48 hour(s))   TYPE AND SCREEN    Collection Time: 12/19/22 10:20 PM   Result Value Ref Range    Crossmatch expiration date 12/22/2022,2355     ABO/Rh O POSITIVE     Antibody Screen NEG    CBC with Auto Differential    Collection Time: 12/19/22 10:22 PM   Result Value Ref Range    WBC 10.5 4.3 - 11.1 K/uL    RBC 3.94 (L) 4.05 - 5.2 M/uL    Hemoglobin 13.2 11.7 - 15.4 g/dL    Hematocrit 39.7 35.8 - 46.3 %    .8 82 - 102 FL    MCH 33.5 (H) 26.1 - 32.9 PG    MCHC 33.2 31.4 - 35.0 g/dL    RDW 13.3 11.9 - 14.6 %    Platelets 281 118 - 144 K/uL    MPV 9.7 9.4 - 12.3 FL    nRBC 0.00 0.0 - 0.2 K/uL    Differential Type AUTOMATED      Seg Neutrophils 82 (H) 43 - 78 %    Lymphocytes 9 (L) 13 - 44 %    Monocytes 6 4.0 - 12.0 %    Eosinophils % 1 0.5 - 7.8 %    Basophils 1 0.0 - 2.0 %    Immature Granulocytes 1 0.0 - 5.0 %    Segs Absolute 8.7 (H) 1.7 - 8.2 K/UL    Absolute Lymph # 1.0 0.5 - 4.6 K/UL    Absolute Mono # 0.6 0.1 - 1.3 K/UL    Absolute Eos # 0.1 0.0 - 0.8 K/UL    Basophils Absolute 0.1 0.0 - 0.2 K/UL    Absolute Immature Granulocyte 0.1 0.0 - 0.5 K/UL   Comprehensive Metabolic Panel    Collection Time: 12/19/22 10:22 PM   Result Value Ref Range    Sodium 135 133 - 143 mmol/L    Potassium 3.6 3.5 - 5.1 mmol/L    Chloride 104 101 - 110 mmol/L    CO2 26 21 - 32 mmol/L    Anion Gap 5 2 - 11 mmol/L    Glucose 103 (H) 65 - 100 mg/dL    BUN 15 8 - 23 MG/DL    Creatinine 0.60 0.6 - 1.0 MG/DL    Est, Glom Filt Rate >60 >60 ml/min/1.73m2    Calcium 9.2 8.3 - 10.4 MG/DL    Total Bilirubin 1.3 (H) 0.2 - 1.1 MG/DL    ALT 31 12 - 65 U/L    AST 35 15 - 37 U/L    Alk Phosphatase 71 50 - 136 U/L    Total Protein 7.5 6.3 - 8.2 g/dL    Albumin 3.9 3.2 - 4.6 g/dL    Globulin 3.6 2.8 - 4.5 g/dL    Albumin/Globulin Ratio 1.1 0.4 - 1.6     Urinalysis    Collection Time: 12/20/22  2:14 AM   Result Value Ref Range    Color, UA YELLOW/STRAW      Appearance CLEAR      Specific Gravity, UA 1.021 1.001 - 1.023      pH, Urine 6.0 5.0 - 9.0      Protein, UA Negative NEG mg/dL    Glucose, UA Negative mg/dL    Ketones, Urine 80 (A) NEG mg/dL    Bilirubin Urine Negative NEG      Blood, Urine Negative NEG      Urobilinogen, Urine 0.2 0.2 - 1.0 EU/dL    Nitrite, Urine Negative NEG      Leukocyte Esterase, Urine Negative NEG     Comprehensive Metabolic Panel w/ Reflex to MG    Collection Time: 12/21/22  5:04 AM   Result Value Ref Range    Sodium 139 133 - 143 mmol/L    Potassium 3.7 3.5 - 5.1 mmol/L    Chloride 108 101 - 110 mmol/L    CO2 30 21 - 32 mmol/L    Anion Gap 1 (L) 2 - 11 mmol/L    Glucose 105 (H) 65 - 100 mg/dL    BUN 12 8 - 23 MG/DL    Creatinine 0.50 (L) 0.6 - 1.0 MG/DL    Est, Glom Filt Rate >60 >60 ml/min/1.73m2    Calcium 8.2 (L) 8.3 - 10.4 MG/DL    Total Bilirubin 1.1 0.2 - 1.1 MG/DL    ALT 18 12 - 65 U/L    AST 15 15 - 37 U/L    Alk Phosphatase 55 50 - 136 U/L    Total Protein 6.1 (L) 6.3 - 8.2 g/dL    Albumin 3.0 (L) 3.2 - 4.6 g/dL    Globulin 3.1 2.8 - 4.5 g/dL    Albumin/Globulin Ratio 1.0 0.4 - 1.6     CBC with Auto Differential    Collection Time: 12/21/22  5:04 AM   Result Value Ref Range    WBC 6.6 4.3 - 11.1 K/uL    RBC 2.96 (L) 4.05 - 5.2 M/uL    Hemoglobin 10.2 (L) 11.7 - 15.4 g/dL    Hematocrit 31.8 (L) 35.8 - 46.3 %    .4 (H) 82 - 102 FL    MCH 34.5 (H) 26.1 - 32.9 PG    MCHC 32.1 31.4 - 35.0 g/dL    RDW 13.7 11.9 - 14.6 %    Platelets 305 769 - 673 K/uL    MPV 10.0 9.4 - 12.3 FL    nRBC 0.00 0.0 - 0.2 K/uL    Differential Type AUTOMATED      Seg Neutrophils 62 43 - 78 %    Lymphocytes 22 13 - 44 %    Monocytes 14 (H) 4.0 - 12.0 % Eosinophils % 2 0.5 - 7.8 %    Basophils 1 0.0 - 2.0 %    Immature Granulocytes 0 0.0 - 5.0 %    Segs Absolute 4.1 1.7 - 8.2 K/UL    Absolute Lymph # 1.4 0.5 - 4.6 K/UL    Absolute Mono # 0.9 0.1 - 1.3 K/UL    Absolute Eos # 0.1 0.0 - 0.8 K/UL    Basophils Absolute 0.0 0.0 - 0.2 K/UL    Absolute Immature Granulocyte 0.0 0.0 - 0.5 K/UL       I have personally reviewed imaging studies showing: Other Studies:  XR HIP RIGHT (2-3 VIEWS)   Final Result   Status post ORIF transfixing the intertrochanteric right femoral neck fracture. [facility] XR TECHNOLOGIST SERVICE   Final Result      XR HIP RIGHT (2-3 VIEWS)   Final Result   Intertrochanteric right hip fracture         XR FEMUR RIGHT (MIN 2 VIEWS)   Final Result   Intertrochanteric right hip fracture      XR TIBIA FIBULA RIGHT (2 VIEWS)   Final Result   Somewhat limited views due to positioning. No definite acute   fracture      XR CHEST PORTABLE   Final Result   1.  2 small nodules in the right upper lung, most likely granulomas. 2.  Possible aortic aneurysm. Recommend CT to evaluate both the lung nodules and the aortic arch.              Current Meds:  Current Facility-Administered Medications   Medication Dose Route Frequency    sodium chloride flush 0.9 % injection 5-40 mL  5-40 mL IntraVENous 2 times per day    sodium chloride flush 0.9 % injection 5-40 mL  5-40 mL IntraVENous PRN    ondansetron (ZOFRAN-ODT) disintegrating tablet 4 mg  4 mg Oral Q8H PRN    Or    ondansetron (ZOFRAN) injection 4 mg  4 mg IntraVENous Q6H PRN    [START ON 12/22/2022] aspirin EC tablet 325 mg  325 mg Oral Daily    ceFAZolin (ANCEF) 2000 mg in sterile water 20 mL IV syringe  2,000 mg IntraVENous Q8H    oxyCODONE (ROXICODONE) immediate release tablet 10 mg  10 mg Oral Q4H PRN    HYDROmorphone HCl PF (DILAUDID) injection 0.5 mg  0.5 mg IntraVENous Q2H PRN    sodium chloride flush 0.9 % injection 5-40 mL  5-40 mL IntraVENous 2 times per day    sodium chloride flush 0.9 % injection 5-40 mL  5-40 mL IntraVENous PRN    0.9 % sodium chloride infusion   IntraVENous PRN    polyethylene glycol (GLYCOLAX) packet 17 g  17 g Oral Daily PRN    acetaminophen (TYLENOL) tablet 650 mg  650 mg Oral Q6H PRN    Or    acetaminophen (TYLENOL) suppository 650 mg  650 mg Rectal Q6H PRN    0.9 % sodium chloride infusion   IntraVENous Continuous    atorvastatin (LIPITOR) tablet 10 mg  10 mg Oral Daily    [Held by provider] metoprolol succinate (TOPROL XL) extended release tablet 50 mg  50 mg Oral Daily    tuberculin injection 5 Units  5 Units IntraDERmal Once    enoxaparin (LOVENOX) injection 40 mg  40 mg SubCUTAneous Q24H       Signed: Jenelle Camilo DO    Part of this note may have been written by using a voice dictation software. The note has been proof read but may still contain some grammatical/other typographical errors.

## 2022-12-21 NOTE — PROGRESS NOTES
Pt VSS pt A&O X's 4 no complaints of pain or discomfort pt resting in bed family at bedside   Pt states that wants Rehab once DC but pt wants home health pt does not want to go to facility for rehab

## 2022-12-21 NOTE — PROGRESS NOTES
TRANSFER - OUT REPORT:    Verbal report given to Keerthi on Diana Hardy  being transferred to Preop for ordered procedure       Report consisted of patient's Situation, Background, Assessment and   Recommendations(SBAR). Information from the following report(s) Nurse Handoff Report, Adult Overview, and Neuro Assessment was reviewed with the receiving nurse. [place] Assessment: Presents to emergency department  because of falls (Syncope, seizure, or loss of consciousness): Yes, Age > 79: Yes, Altered Mental Status, Intoxication with alcohol or substance confusion (Disorientation, impaired judgment, poor safety awaremess, or inability to follow instructions): No, Impaired Mobility: Ambulates or transfers with assistive devices or assistance; Unable to ambulate or transer.: Yes, Nursing Judgement: Yes  Lines:   Peripheral IV 12/19/22 Left; Anterior Forearm (Active)   Site Assessment Clean, dry & intact 12/20/22 1940   Line Status Flushed; Infusing;Capped 12/20/22 1940   Line Care Cap changed; Connections checked and tightened 12/20/22 1940   Phlebitis Assessment No symptoms 12/20/22 1940   Infiltration Assessment 0 12/20/22 1940   Alcohol Cap Used Yes 12/20/22 1940   Dressing Status Clean, dry & intact 12/20/22 1940   Dressing Type Transparent 12/20/22 1940        Opportunity for questions and clarification was provided.       Patient transported with:  Registered Nurse

## 2022-12-21 NOTE — PROGRESS NOTES
Physical Therapy Note:    Physical therapy evaluation orders received and chart reviewed. Patient scheduled for surgical repair of right hip fracture today with Dr. Ness Pillai. Will hold mobility assessment and see post operatively day #1 per orthopedic surgery.  Thank you,    Riccardo Peralta, PT, DPT  12/21/22

## 2022-12-21 NOTE — PERIOP NOTE
TRANSFER - OUT REPORT:    Verbal report given to Gideon Staley RN on Marguerite Diane  being transferred to  for routine post-op       Report consisted of patients Situation, Background, Assessment and   Recommendations(SBAR). Information from the following report(s) Nurse Handoff Report, Surgery Report, Intake/Output, MAR, Cardiac Rhythm SR, and Neuro Assessment was reviewed with the receiving nurse. Lines:   Peripheral IV 12/19/22 Left; Anterior Forearm (Active)   Site Assessment Clean, dry & intact 12/21/22 0811   Line Status Infusing 12/21/22 0811   Line Care Cap changed; Connections checked and tightened 12/21/22 0811   Phlebitis Assessment No symptoms 12/21/22 0811   Infiltration Assessment 0 12/21/22 0811   Alcohol Cap Used Yes 12/21/22 0811   Dressing Status Clean, dry & intact 12/21/22 0811   Dressing Type Transparent 12/21/22 0811        Opportunity for questions and clarification was provided. Patient transported with:   O2 @ 2 liters  Tech    VTE prophylaxis orders have been written for Marguerite Diane. Patient and family given floor number and nurses name. Family updated re: pt status after security code verified.

## 2022-12-21 NOTE — OP NOTE
Operative Report    Patient: Laine Terrell MRN: [id number]  SSN: [id number]    YOB: 1950  Age: 67 y.o. Sex: female       Date of Surgery: December 21, 2022     History:  Laine Terrell is a 67 y.o. female who fell and injured her right hip. She was seen in the emergency room and found to have a right peritrochanteric/intertrochanteric proximal femur fracture. I did talk to her about operative intervention and try to explain exactly what this would involve. Use milliradians to help her and her family understand what the implant look like and what her surgery would be as well as where the incision would be. After giving her a chance to ask any questions she seemed very comfortable with consenting. I talked to the patient and/or their representative and explained the exact nature the procedure. I also went through a detailed list of the material risks associated with  the procedure which included risk of bleeding, infection, injury to nearby structures, worsening the situation, as well as the risks associate with anesthesia and finally death. Also talked with him regarding the benefits and alternatives to the procedure. Preoperative Diagnosis: Closed fracture of right hip, [facility] [S72.001A]     Postoperative Diagnosis:   Closed displaced right intertrochanteric/peritrochanteric proximal femur fracture      Surgeon(s) and Role:     * Hermilo Bowen MD - Primary    Anesthesia: General     Procedure: Open treatment of right intertrochanteric/peritrochanteric proximal femur fracture    Procedure in Detail: After successful  induction of general anesthetic the right lower extremity was placed in gentle boot traction on a fracture table and we made sure we could adequately visualize the osteoporotic proximal femur and that an adequate closed reduction could be obtained.   I then prepped and draped the right hip and thigh area and made a small incision just proximal to the area the greater trochanter and placed the cannulated awl into the tip the greater trochanter on both the AP and lateral projection and once it was in appropriate position placed a guidewire down the shaft of the femur and then reamed sequentially up to 13 mm for the shaft of the femur and to 15.5 mm proximally. I then measured for a 400 mm nail and placed the actual nail. Once the nail was in appropriate position I placed a guidewire in the center of the femoral head on both the AP and lateral projection. Once this was in appropriate position I drilled for and placed a 95 mm lag screw proximally. Once this was in position I then placed a set screw in the proximal portion of the nail and tightened this all the way down and backed off a half of a turn to allow for dynamic compression. I then placed a single interlock bolt distally using freehand technique. I then removed the insertion handle and checked the final reduction as well as the placement of the hardware. I was very pleased with this I then closed incisions with 2.0 Monocryl for the subcutaneous tissue and staples for the skin. Dressings were applied. The patient was awakened and taken recovery in stable condition there were no apparent complications      Estimated Blood Loss: 300 cc    Tourniquet Time: * No tourniquets in log *      Implants: * No implants in log *            Specimens: * No specimens in log *        Drains: None                Complications: None    Counts: Sponge and needle counts were correct times two.     Signed By:  Hermilo Bowen MD     December 21, 2022

## 2022-12-21 NOTE — PROGRESS NOTES
OT orders received. Patient going to OR with Dr. Lizzeth Mota today. HOLD OT and evaluate POD1.    Amalia Diaz, OT

## 2022-12-22 LAB
HCT VFR BLD AUTO: 24.1 % (ref 35.8–46.3)
HGB BLD-MCNC: 7.7 G/DL (ref 11.7–15.4)

## 2022-12-22 PROCEDURE — 2580000003 HC RX 258: Performed by: ORTHOPAEDIC SURGERY

## 2022-12-22 PROCEDURE — 36415 COLL VENOUS BLD VENIPUNCTURE: CPT

## 2022-12-22 PROCEDURE — 97162 PT EVAL MOD COMPLEX 30 MIN: CPT

## 2022-12-22 PROCEDURE — 97530 THERAPEUTIC ACTIVITIES: CPT

## 2022-12-22 PROCEDURE — 6370000000 HC RX 637 (ALT 250 FOR IP): Performed by: ORTHOPAEDIC SURGERY

## 2022-12-22 PROCEDURE — 6360000002 HC RX W HCPCS: Performed by: FAMILY MEDICINE

## 2022-12-22 PROCEDURE — 97165 OT EVAL LOW COMPLEX 30 MIN: CPT

## 2022-12-22 PROCEDURE — 6370000000 HC RX 637 (ALT 250 FOR IP): Performed by: HOSPITALIST

## 2022-12-22 PROCEDURE — 97535 SELF CARE MNGMENT TRAINING: CPT

## 2022-12-22 PROCEDURE — 1100000000 HC RM PRIVATE

## 2022-12-22 PROCEDURE — 85014 HEMATOCRIT: CPT

## 2022-12-22 RX ADMIN — SODIUM CHLORIDE, PRESERVATIVE FREE 5 ML: 5 INJECTION INTRAVENOUS at 20:56

## 2022-12-22 RX ADMIN — ATORVASTATIN CALCIUM 10 MG: 10 TABLET, FILM COATED ORAL at 08:29

## 2022-12-22 RX ADMIN — ENOXAPARIN SODIUM 40 MG: 100 INJECTION SUBCUTANEOUS at 14:52

## 2022-12-22 RX ADMIN — ACETAMINOPHEN 650 MG: 325 TABLET ORAL at 15:00

## 2022-12-22 RX ADMIN — OXYCODONE 10 MG: 5 TABLET ORAL at 18:59

## 2022-12-22 RX ADMIN — Medication 6 MG: at 20:54

## 2022-12-22 RX ADMIN — ASPIRIN 325 MG: 325 TABLET, COATED ORAL at 08:29

## 2022-12-22 ASSESSMENT — PAIN DESCRIPTION - ORIENTATION
ORIENTATION: RIGHT
ORIENTATION: RIGHT

## 2022-12-22 ASSESSMENT — PAIN DESCRIPTION - LOCATION
LOCATION: HIP
LOCATION: LEG

## 2022-12-22 ASSESSMENT — PAIN SCALES - GENERAL
PAINLEVEL_OUTOF10: 0
PAINLEVEL_OUTOF10: 5

## 2022-12-22 ASSESSMENT — PAIN DESCRIPTION - DESCRIPTORS
DESCRIPTORS: DISCOMFORT
DESCRIPTORS: SHOOTING

## 2022-12-22 NOTE — PROGRESS NOTES
ACUTE PHYSICAL THERAPY GOALS:   (Developed with and agreed upon by patient and/or caregiver.)  (1.) Adamaris Juárez  will move from supine to sit and sit to supine , scoot up and down, and roll side to side with MODIFIED INDEPENDENCE within 7 treatment day(s). (2.) Adamaris Juárez will transfer from bed to chair and chair to bed with MODIFIED INDEPENDENCE using the least restrictive device within 7 treatment day(s). (3.) Adamaris Juárez will ambulate with MODIFIED INDEPENDENCE for 200 feet with the least restrictive device within 7 treatment day(s). (4.) Adamaris Markhamer will perform standing static and dynamic balance activities x 25 minutes with MODIFIED INDEPENDENCE to improve safety within 7 treatment day(s). (5.) Adamaris Juárez will ascend and descend 5 stairs using bilateral hand rail(s) with MODIFIED INDEPENDENCE to improve functional mobility and safety within 7 treatment day(s). (6.) Adamaris Juárez will perform therapeutic exercises x 20 min for HEP with INDEPENDENCE to improve strength, endurance, and functional mobility within 7 treatment day(s). PHYSICAL THERAPY: Daily Note PM   (Link to Caseload Tracking: PT Visit Days : 1  Time In/Out PT Charge Capture  Rehab Caseload Tracker  Orders    Adamaris Juárez is a 67 y.o. female   PRIMARY DIAGNOSIS: Closed right hip fracture, initial encounter ([facility]  Fall, initial encounter [W19. XXXA]  Closed right hip fracture, initial encounter ([facility] [address] [S72.001A]  Closed intertrochanteric fracture of hip, right, initial encounter ([facility] [address] [S72.141A]  Procedure(s) (LRB):  RIGHT GAMMA NAIL  INSERTION (Right)  1 Day Post-Op  Inpatient: Payor: [address] [place] [address] / Plan: MEDICARE PART A AND B / Product Type: *No Product type* /     ASSESSMENT:     REHAB RECOMMENDATIONS:   Recommendation to date pending progress:  Setting:  Home Health Therapy    Equipment:    Rolling Walker     ASSESSMENT:  Ms. Chacha Broussard is a 67year old F who presents s/p R gamma nailing after she sustained a fx after a fall when she got tripped up on her dog. WBAT RLE. This afternoon pt presents resting in chair, agreeable to therapy. Pt was able to tolerate increased mobility and standing activity this session. No episodes of dizziness this session. BP after first gait distance  124/72 mm Hg, HR 88  bpm. Able to increase gait distance 2 x 25 ft with RW, chair follow for safety. Sit <> stand transfer from lower bedside commode surface Min A, cues for placement of BUE and BLE to facilitate ease of transfer. Bed mobility Min A overall to progress RLE back into bed. Cues for sequencing. Pt does require some redirection for safety, sequencing, and attention to task. Good progress this session. Pt presents as functioning below her baseline, with deficits in mobility including transfers, gait, balance, and activity tolerance. Pt will benefit from skilled therapy services to address stated deficits to promote return to highest level of function, independence, and safety. Will continue to follow.      SUBJECTIVE:   Ms. Castro states, \"I'm tired\"     Social/Functional Lives With: Spouse  Type of Home: House  Home Layout: Two level  Home Access: Stairs to enter with rails, Elevator  Entrance Stairs - Number of Steps: 5  Bathroom Shower/Tub: Tub/Shower unit  Bathroom Toilet: Standard  Bathroom Equipment: 3-in-1 commode  Home Equipment: [employer], standard, [address]  ADL Assistance: Independent  Homemaking Assistance: Independent  Ambulation Assistance: Independent  Transfer Assistance: Independent  Active : Yes  Mode of Transportation: Car  Occupation: Retired  OBJECTIVE:     PAIN: VITALS / O2: PRECAUTION / [employer] / [employer]:   Pre Treatment: 0/10 at rest, 10/10 with mobility per pt report         Post Treatment: Same as above Vitals   Vitals  BP: 124/72  MAP (Calculated): 89    Oxygen    IV    RESTRICTIONS/PRECAUTIONS:  Restrictions/Precautions  Restrictions/Precautions: Fall Risk, Bed Alarm, Weight Bearing  Lower Extremity Weight Bearing Restrictions  Right Lower Extremity Weight Bearing: Weight Bearing As Tolerated  Restrictions/Precautions: Fall Risk, Bed Alarm, Weight Bearing     MOBILITY: I Mod I S SBA CGA Min Mod Max Total  NT x2 Comments:   Bed Mobility    Rolling [] [] [] [] [x] [] [] [] [] [] []    Supine to Sit [] [] [] [] [x] [] [] [] [] [] []    Scooting [] [] [] [] [x] [] [] [] [] [] []    Sit to Supine [] [] [] [] [x] [x] [] [] [] [] []    Transfers    Sit to Stand [] [] [] [] [x] [x] [] [] [] [] []  Inc assist from lower surface   Bed to Chair [] [] [] [] [x] [] [] [] [] [] []    Stand to Sit [] [] [] [] [x] [] [] [] [] [] []     [] [] [] [] [] [] [] [] [] [] []    I=Independent, Mod I=Modified Independent, S=Supervision, SBA=Standby Assistance, CGA=Contact Guard Assistance,   Min=Minimal Assistance, Mod=Moderate Assistance, Max=Maximal Assistance, Total=Total Assistance, NT=Not Tested    BALANCE: Good Fair+ Fair Fair- Poor NT Comments   Sitting Static [x] [] [] [] [] []    Sitting Dynamic [x] [] [] [] [] []              Standing Static [] [x] [] [] [] []    Standing Dynamic [] [x] [x] [] [] []      GAIT: I Mod I S SBA CGA Min Mod Max Total  NT x2 Comments:   Level of Assistance [] [] [] [] [x] [x] [] [] [] [] []    Distance 2 x 25 feet    DME Gait Belt and Rolling Walker    Gait Quality Antalgic    Weightbearing Status Right Lower Extremity Weight Bearing: Weight Bearing As Tolerated    Stairs      I=Independent, Mod I=Modified Independent, S=Supervision, SBA=Standby Assistance, CGA=Contact Guard Assistance,   Min=Minimal Assistance, Mod=Moderate Assistance, Max=Maximal Assistance, Total=Total Assistance, NT=Not Tested    PLAN:   FREQUENCY AND DURATION: BID for duration of hospital stay or until stated goals are met, whichever comes first.    TREATMENT:   TREATMENT:   Therapeutic Activity (24 Minutes):  Therapeutic activity included Sit to Supine, Scooting, Transfer Training, Ambulation on level ground, Sitting balance , and Standing balance to improve functional Activity tolerance, Balance, Coordination, Mobility, and Strength. TREATMENT GRID:  N/A    AFTER TREATMENT PRECAUTIONS: Bed, Bed/Chair Locked, Call light within reach, Needs within reach, and RN notified    INTERDISCIPLINARY COLLABORATION:  RN/ PCT, PT/ PTA, and Rehab Attendant    EDUCATION: Education Given To: Patient  Education Provided: Role of Therapy;Plan of Care;Precautions;Transfer Training; Fall Prevention Strategies; Equipment  Education Outcome: Continued education needed    TIME IN/OUT:  Time In: 1327  Time Out: [address]  Minutes: [address] [place], [place]

## 2022-12-22 NOTE — CARE COORDINATION
CM met with pt/son/family member to discuss dc needs. Pt is adamant she does not want to go to rehab but instead wants to dc home with [address] [place] [address] and therapy services. Pt expressed no preference of agency. HH referral submitted to [facility]. Pt has a BSC but needs a RW. Order submitted to [facility] CM will ensure RW is delivered to pt's room prior to dc. No other dc needs or concerns identified or reported. CM to continue to follow to assist as needed. 12/22/22 1331   Service Assessment   Patient Orientation Alert and Oriented   Cognition Alert   History Provided By Patient;Medical Record   Primary Caregiver Self   Accompanied By/Relationship Son/Suraj   Support Systems Spouse/Significant Other;Children; Other (Comment); Family Members  ([place])   [facility] is: Named in [address] [place] [address]  (Son Giuliana aPn [phone] POA)   PCP Verified by CM Yes  (Dr. Rylie Rodriguez)   Last Visit to PCP Within last 6 months   Prior Functional Level Independent in ADLs/IADLs   Current Functional Level Assistance with the following:;Mobility; Independent in ADLs/IADLs   Can patient return to prior living arrangement Yes   Ability to make needs known: Good   Family able to assist with home care needs: Yes   Would you like for me to discuss the discharge plan with any other family members/significant others, and if so, who? No   Financial Resources Medicare;[employer] ([place])   Community Resources None   Social/Functional History   Lives With Spouse   Type of Home House   Home Layout Two level   Home Access Stairs to enter with rails;Elevator   Entrance Stairs - Number of Steps 5   Bathroom Shower/Tub Tub/Shower unit   Bathroom Toilet Standard   Bathroom Equipment 3-in-1 commode   Home Equipment Cane;Walker, standard; Wheelchair-manual   ADL Assistance Independent   Homemaking Assistance Independent   Ambulation Assistance Independent   Transfer Assistance Independent   Active  Yes   Mode of Transportation Car   Occupation Retired   Discharge Planning   Type of [address] Prior To Admission None   [address] DME Needed Walker   DME Ordered? Coreen Commander  ([place] from [address])   Type of [address] OT;PT;Nursing Services   Patient expects to be discharged to: House   One/Two Story Residence Two story   # of Interior Steps 4   History of falls? 1   Services At/After Discharge   Transition of Care Consult (CM Consult) Home Health;DME/Supply Assistance   Internal Home Health No   Reason Outside Agency Chosen Unable to staff case;Script used patient chose alternate agency   Services At/After Discharge OT;PT;Home Health;DME;Nursing services   [facility] [place] [employer] Information Provided? No   Mode of Transport at Discharge Other (see comment)  (family)   Confirm Follow Up Transport Family   Condition of Participation: Discharge Planning   The Plan for Transition of Care is related to the following treatment goals: [facility] nursing and therapy services to support pt's care and recovery in the home after dc   The Patient and/or Patient Representative was provided with a Choice of Provider? Patient   The Patient and/Or Patient Representative agree with the Discharge Plan? Yes   Freedom of Choice list was provided with basic dialogue that supports the patient's individualized plan of care/goals, treatment preferences, and shares the quality data associated with the providers?   Yes

## 2022-12-22 NOTE — PROGRESS NOTES
ACUTE PHYSICAL THERAPY GOALS:   (Developed with and agreed upon by patient and/or caregiver.)  (1.) Adamaris Juárez  will move from supine to sit and sit to supine , scoot up and down, and roll side to side with MODIFIED INDEPENDENCE within 7 treatment day(s). (2.) Adamaris Juárez will transfer from bed to chair and chair to bed with MODIFIED INDEPENDENCE using the least restrictive device within 7 treatment day(s). (3.) Adamaris Juárez will ambulate with MODIFIED INDEPENDENCE for 200 feet with the least restrictive device within 7 treatment day(s). (4.) Adamaris Juárez will perform standing static and dynamic balance activities x 25 minutes with MODIFIED INDEPENDENCE to improve safety within 7 treatment day(s). (5.) Adamaris Juárez will ascend and descend 5 stairs using bilateral hand rail(s) with MODIFIED INDEPENDENCE to improve functional mobility and safety within 7 treatment day(s). (6.) Adamaris Juárez will perform therapeutic exercises x 20 min for HEP with INDEPENDENCE to improve strength, endurance, and functional mobility within 7 treatment day(s). PHYSICAL THERAPY Initial Assessment, Daily Note, and AM  (Link to Caseload Tracking: PT Visit Days : 1  Acknowledge Orders  Time In/Out  PT Charge Capture  Rehab Caseload Tracker    Adamaris Juárez is a 67 y.o. female   PRIMARY DIAGNOSIS: Closed right hip fracture, initial encounter ([facility] [address]  Fall, initial encounter [W19. XXXA]  Closed right hip fracture, initial encounter ([facility] [address] [S72.001A]  Closed [facility]tro[facility] hip, right[facility] [S72.141A]  Procedure(s) (LRB):  RIGHT GAMMA NAIL  INSERTION (Right)  1 Day Post-Op  Reason for Referral: Pain in Right Hip (M25.551)  Stiffness of Right Hip, Not elsewhere classified (M25.651)  Difficulty in walking, Not elsewhere classified (R26.2)  Other abnormalities of gait and mobility (R26.89)  Generalized Muscle Weakness (M62.81)  History of falling (Z91.81)  Dizziness and Giddiness (R42)  Inpatient: Payor: Gill Davis / Plan: MEDICARE PART A AND B / Product Type: *No Product type* /     ASSESSMENT:     REHAB RECOMMENDATIONS:   Recommendation to date pending progress:  Setting:  Rehab  (but pt not agreeable - at least would need [facility] services)    Equipment:    Rolling Walker     ASSESSMENT:  Ms. Kenzie Domingo is a 67year old F who presents s/p R gamma nailing after she sustained a fx after a fall when she got tripped up on her dog. WBAT RLE. This date pt performs mobility including bed mobility, sitting balance activities, sit <> stand transfers  with CGA to Lizett, cues for safety and sequencing. Requiring additional time for all mobility. She has decreased insight into safety and deficits. Pt experienced dizziness and lightheadedness with standing and during ambulation. Vitals assessed:      Position BP Heartrate   Reclining 109/65 mm Hg 78 bpm   Standing  100/58 mm Hg 103 bpm   Seated - Resting after stand 112/69 mm Hg 81 bpm   After Ambulation - Standing 87/71 mm Hg 113 bpm   5 minutes post activity - Seated 104/68 mm Hg 83 bpm      MD notified. Standing and ambulation limited this session secondary to orthostatic hypotension. Pt presents as functioning below her baseline, with deficits in mobility including transfers, gait, balance, and activity tolerance. Pt will benefit from skilled therapy services to address stated deficits to promote return to highest level of function, independence, and safety. Will continue to follow.      [facility] [address] [address] AM-PAC 6 Clicks Basic Mobility Inpatient Short Form  AM-PAC Mobility Inpatient   How much difficulty turning over in bed?: A Little  How much difficulty sitting down on / standing up from a chair with arms?: A Little  How much difficulty moving from lying on back to sitting on side of bed?: A Little  How much help from another person moving to and from a bed to a chair?: A Little  How much help from another person needed to walk in hospital room?: A Little  How much help from another person for climbing 3-5 steps with a railing?: A Lot  AM-PAC Inpatient Mobility Raw Score : 17  AM-PAC Inpatient T-Scale Score : 42.13  Mobility Inpatient CMS 0-100% Score: 50.57  Mobility Inpatient CMS G-Code Modifier : CK    SUBJECTIVE:   Ms. Bryon Moses states, \"I will NOT be going anywhere for rehab.  I can get rehab at home\"     Social/Functional Lives With: Spouse  Type of Home: House  Home Layout: Two level  Home Access: Stairs to enter with rails  Entrance Stairs - Number of Steps: 5  Bathroom Shower/Tub: Tub/Shower unit  Bathroom Toilet: Standard  Home Equipment: Lyndia Seat, standard, [employer]  ADL Assistance: Independent  Ambulation Assistance: Independent  Transfer Assistance: Independent  Active : Yes  Mode of Transportation: Car  Occupation: Retired    OBJECTIVE:     PAIN: VITALS / O2: PRECAUTION / Jamison Troncoso / John Dad:   Pre Treatment: 1/10         Post Treatment: 3/10 increase with mobility, back down at rest Vitals        Oxygen      IV    RESTRICTIONS/PRECAUTIONS:  Restrictions/Precautions: Fall Risk, Bed Alarm, Weight Bearing  Right Lower Extremity Weight Bearing: Weight Bearing As Tolerated              GROSS EVALUATION: Intact Impaired (Comments):   AROM [x]   Except RLE hip limited by post-op pain/weakness   PROM [x]    Strength []   Generalized weakness, but functional   Balance [] Sitting - Static: Good  Sitting - Dynamic: Good  Standing - Static: Fair  Standing - Dynamic: Fair   Posture [] Forward Head  Rounded Shoulders   Sensation [x]     Coordination [x]      Tone []     Edema []    Activity Tolerance []   Limited by orthostatic hypotension    []      COGNITION/  PERCEPTION: Intact Impaired (Comments):   Orientation [x]     Vision [x]     Hearing [x]     Cognition  []   Decreased insight into safety and deficits     MOBILITY: I Mod I S SBA CGA Min Mod Max Total  NT x2 Comments:   Bed Mobility    Rolling [] [] [] [] [x] [] [] [] [] [] []    Supine to Sit [] [] [] [] [x] [] [] [] [] [] []  With additional time to progress RLE off bed   Scooting [] [] [] [] [x] [] [] [] [] [] []    Sit to Supine [] [] [] [] [x] [] [] [] [] [] []    Transfers    Sit to Stand [] [] [] [] [x] [x] [] [] [] [] []  Cues for BUE placement and safe sequencing   Bed to Chair [] [] [] [] [x] [] [] [] [] [] []    Stand to Sit [] [] [] [] [x] [] [] [] [] [] []     [] [] [] [] [] [] [] [] [] [] []    I=Independent, Mod I=Modified Independent, S=Supervision, SBA=Standby Assistance, CGA=Contact Guard Assistance,   Min=Minimal Assistance, Mod=Moderate Assistance, Max=Maximal Assistance, Total=Total Assistance, NT=Not Tested    GAIT: I Mod I S SBA CGA Min Mod Max Total  NT x2 Comments:   Level of Assistance [] [] [] [] [x] [] [] [] [] [] []    Distance 2 x 10 feet    DME Gait Belt and Rolling Walker    Gait Quality Antalgic    Weightbearing Status Restrictions/Precautions  Restrictions/Precautions: Fall Risk, Bed Alarm, Weight Bearing  Lower Extremity Weight Bearing Restrictions  Right Lower Extremity Weight Bearing: Weight Bearing As Tolerated    Stairs      I=Independent, Mod I=Modified Independent, S=Supervision, SBA=Standby Assistance, CGA=Contact Guard Assistance,   Min=Minimal Assistance, Mod=Moderate Assistance, Max=Maximal Assistance, Total=Total Assistance, NT=Not Tested    PLAN:   FREQUENCY AND DURATION: BID for duration of hospital stay or until stated goals are met, whichever comes first.    THERAPY PROGNOSIS: Good    PROBLEM LIST:   (Skilled intervention is medically necessary to address:)  Decreased ADL/Functional Activities  Decreased Activity Tolerance  Decreased AROM/PROM  Decreased Balance  Decreased Coordination  Decreased Gait Ability  Decreased Safety Awareness  Decreased Strength  Decreased Transfer Abilities  Increased Pain INTERVENTIONS PLANNED:   (Benefits and precautions of physical therapy have been discussed with the patient.)  Self Care Training  Therapeutic Activity  Therapeutic Exercise/HEP  Neuromuscular Re-education  Gait Training  Education       TREATMENT:   EVALUATION: MODERATE COMPLEXITY: (Untimed Charge)    TREATMENT:   Therapeutic Activity (24 Minutes): Therapeutic activity included Supine to Sit, Scooting, Transfer Training, Ambulation on level ground, Sitting balance , and Standing balance to improve functional Activity tolerance, Balance, Coordination, Mobility, and Strength. TREATMENT GRID:  N/A    AFTER TREATMENT PRECAUTIONS: Bed/Chair Locked, Call light within reach, Chair, Needs within reach, RN notified, Visitors at bedside, and MD at bedside    INTERDISCIPLINARY COLLABORATION:  MD/ PA/ NP , RN/ PCT, PT/ PTA, and OT/ BASILIO    EDUCATION: Education Given To: Patient  Education Provided: Role of Therapy;Plan of Care;Precautions;Transfer Training; Fall Prevention Strategies; Equipment  Education Outcome: Continued education needed    TIME IN/OUT:  Time In: 0850  Time Out: 40 [facility]  Minutes: 3601 [facility], [address] [facility]

## 2022-12-22 NOTE — PROGRESS NOTES
Hospitalist Progress Note   Admit Date:  2022  8:13 PM   Name:  Bárbara Melgoza   Age:  67 y.o. Sex:  female  :  1950   MRN:  [id number]   Room:  [facility]/    Presenting Complaint: Fall     Reason(s) for Admission: Fall, initial encounter [W19. XXXA]  Closed right hip fracture, initial encounter ([facility] [address] [S72.001A]  Closed intertrochanteric fracture of hip, right, initial encounter [facility]) [address] Course:   60-year-old female with history of HTN presented to the ED after due to a fall after she tripped over by her dog and landed on her right hip. In the ER, XR right hip shows intertrochanteric right hip fracture. Orthopedic surgery was consulted and pt underwent right gamma nail insertion procedure on . Subjective & 24hr Events (22): Patient still refusing to go to SNF based on what happened with her mother. Noticed to be orthostatic this am with a drop in hg reported  Assessment & Plan:     Closed right hip fracture, initial encounter   2/2 mechanical fall. XR right hip shows intertrochanteric right hip fracture. S/p ORIF on   Follow Hgb&Hct post-op  PT/OT  Analgesics prn  DVT ppx per primary  Ortho on board, appreciate recs    Post op anemia  -now orthostatic and likely due to blood loss  - Hg 7 so will start iv fluids    Hypercholesterolemia  Cont statin    Essential hypertension  Hold home metoprolol to limit hypotension      Anticipated discharge needs:      1-2 days pending stable hgb, f/u Ortho recs. PT/OT recommends HH if patient will not go to rehab    Diet:  ADULT DIET; Regular  DVT PPx: Lovenox SQ  Code status: Full Code    Hospital Problems:  Principal Problem:    Closed right hip fracture, initial encounter [facility])  Active Problems:    Hypercholesterolemia    Essential hypertension  Resolved Problems:    * No resolved hospital problems.  *      Objective:   Patient Vitals for the past 24 hrs:   Temp Pulse Resp BP SpO2   22 1524 98.8 °F (37.1 °C) 81 17 101/63 96 %   12/22/22 1407 -- -- -- 124/72 --   12/22/22 1138 98.6 °F (37 °C) 75 17 117/73 97 %   12/22/22 0746 98.6 °F (37 °C) 69 16 108/85 93 %   12/22/22 0324 98.4 °F (36.9 °C) 86 20 (!) 115/91 94 %   12/21/22 2313 98.8 °F (37.1 °C) 82 18 108/77 91 %   12/21/22 2020 98.2 °F (36.8 °C) 87 18 126/75 96 %       Oxygen Therapy  SpO2: 96 %  Pulse via Oximetry: 83 beats per minute  Pulse Oximeter Device Mode: Intermittent  Pulse Oximeter Device Location: Finger  O2 Device: Nasal cannula  O2 Flow Rate (L/min): 2 L/min    Estimated body mass index is 21.97 kg/m² as calculated from the following:    Height as of this encounter: 5' 4\" (1.626 m). Weight as of this encounter: 128 lb (58.1 kg). Intake/Output Summary (Last 24 hours) at 12/22/2022 1615  Last data filed at 12/22/2022 1220  Gross per 24 hour   Intake 960 ml   Output 1650 ml   Net -690 ml         Physical Exam:     Blood pressure 101/63, pulse 81, temperature 98.8 °F (37.1 °C), temperature source Oral, resp. rate 17, height 5' 4\" (1.626 m), weight 128 lb (58.1 kg), SpO2 96 %. General:    Well nourished. Head:  Normocephalic, atraumatic  Eyes:  Sclerae appear normal.  Pupils equally round. ENT:  Nares appear normal, no drainage. Moist oral mucosa  Neck:  No restricted ROM. Trachea midline   CV:   RRR. No m/r/g. No jugular venous distension. Lungs:   CTAB. No wheezing, rhonchi, or rales. Symmetric expansion. Abdomen: Bowel sounds present. Soft, nontender, nondistended. Extremities: No cyanosis or clubbing. No edema. R hip dressing c/d/i  Skin:     No rashes and normal coloration. Warm and dry. Neuro:  CN II-XII grossly intact. Sensation intact. A&Ox3  Psych:  Normal mood and affect.       I have personally reviewed labs and tests showing:  Recent Labs:  Recent Results (from the past 48 hour(s))   Comprehensive Metabolic Panel w/ Reflex to MG    Collection Time: 12/21/22  5:04 AM   Result Value Ref Range    Sodium 139 133 - 143 mmol/L Potassium 3.7 3.5 - 5.1 mmol/L    Chloride 108 101 - 110 mmol/L    CO2 30 21 - 32 mmol/L    Anion Gap 1 (L) 2 - 11 mmol/L    Glucose 105 (H) 65 - 100 mg/dL    BUN 12 8 - 23 MG/DL    Creatinine 0.50 (L) 0.6 - 1.0 MG/DL    Est, Glom Filt Rate >60 >60 ml/min/1.73m2    Calcium 8.2 (L) 8.3 - 10.4 MG/DL    Total Bilirubin 1.1 0.2 - 1.1 MG/DL    ALT 18 12 - 65 U/L    AST 15 15 - 37 U/L    Alk Phosphatase 55 50 - 136 U/L    Total Protein 6.1 (L) 6.3 - 8.2 g/dL    Albumin 3.0 (L) 3.2 - 4.6 g/dL    Globulin 3.1 2.8 - 4.5 g/dL    Albumin/Globulin Ratio 1.0 0.4 - 1.6     CBC with Auto Differential    Collection Time: 12/21/22  5:04 AM   Result Value Ref Range    WBC 6.6 4.3 - 11.1 K/uL    RBC 2.96 (L) 4.05 - 5.2 M/uL    Hemoglobin 10.2 (L) 11.7 - 15.4 g/dL    Hematocrit 31.8 (L) 35.8 - 46.3 %    .4 (H) 82 - 102 FL    MCH 34.5 (H) 26.1 - 32.9 PG    MCHC 32.1 31.4 - 35.0 g/dL    RDW 13.7 11.9 - 14.6 %    Platelets 653 450 - 338 K/uL    MPV 10.0 9.4 - 12.3 FL    nRBC 0.00 0.0 - 0.2 K/uL    Differential Type AUTOMATED      Seg Neutrophils 62 43 - 78 %    Lymphocytes 22 13 - 44 %    Monocytes 14 (H) 4.0 - 12.0 %    Eosinophils % 2 0.5 - 7.8 %    Basophils 1 0.0 - 2.0 %    Immature Granulocytes 0 0.0 - 5.0 %    Segs Absolute 4.1 1.7 - 8.2 K/UL    Absolute Lymph # 1.4 0.5 - 4.6 K/UL    Absolute Mono # 0.9 0.1 - 1.3 K/UL    Absolute Eos # 0.1 0.0 - 0.8 K/UL    Basophils Absolute 0.0 0.0 - 0.2 K/UL    Absolute Immature Granulocyte 0.0 0.0 - 0.5 K/UL   Hemoglobin and Hematocrit    Collection Time: 12/22/22  4:29 AM   Result Value Ref Range    Hemoglobin 7.7 (L) 11.7 - 15.4 g/dL    Hematocrit 24.1 (L) 35.8 - 46.3 %       I have personally reviewed imaging studies showing: Other Studies:  XR HIP RIGHT (2-3 VIEWS)   Final Result   Status post ORIF transfixing the intertrochanteric right femoral neck fracture.          NC XR TECHNOLOGIST SERVICE   Final Result      XR HIP RIGHT (2-3 VIEWS)   Final Result   Intertrochanteric right hip fracture         XR FEMUR RIGHT (MIN 2 VIEWS)   Final Result   Intertrochanteric right hip fracture      XR TIBIA FIBULA RIGHT (2 VIEWS)   Final Result   Somewhat limited views due to positioning. No definite acute   fracture      XR CHEST PORTABLE   Final Result   1.  2 small nodules in the right upper lung, most likely granulomas. 2.  Possible aortic aneurysm. Recommend CT to evaluate both the lung nodules and the aortic arch. Current Meds:  Current Facility-Administered Medications   Medication Dose Route Frequency    sodium chloride flush 0.9 % injection 5-40 mL  5-40 mL IntraVENous 2 times per day    sodium chloride flush 0.9 % injection 5-40 mL  5-40 mL IntraVENous PRN    ondansetron (ZOFRAN-ODT) disintegrating tablet 4 mg  4 mg Oral Q8H PRN    Or    ondansetron (ZOFRAN) injection 4 mg  4 mg IntraVENous Q6H PRN    aspirin EC tablet 325 mg  325 mg Oral Daily    oxyCODONE (ROXICODONE) immediate release tablet 10 mg  10 mg Oral Q4H PRN    HYDROmorphone HCl PF (DILAUDID) injection 0.5 mg  0.5 mg IntraVENous Q2H PRN    melatonin tablet 6 mg  6 mg Oral Nightly PRN    sodium chloride flush 0.9 % injection 5-40 mL  5-40 mL IntraVENous 2 times per day    sodium chloride flush 0.9 % injection 5-40 mL  5-40 mL IntraVENous PRN    0.9 % sodium chloride infusion   IntraVENous PRN    polyethylene glycol (GLYCOLAX) packet 17 g  17 g Oral Daily PRN    acetaminophen (TYLENOL) tablet 650 mg  650 mg Oral Q6H PRN    Or    acetaminophen (TYLENOL) suppository 650 mg  650 mg Rectal Q6H PRN    0.9 % sodium chloride infusion   IntraVENous Continuous    atorvastatin (LIPITOR) tablet 10 mg  10 mg Oral Daily    [Held by provider] metoprolol succinate (TOPROL XL) extended release tablet 50 mg  50 mg Oral Daily    enoxaparin (LOVENOX) injection 40 mg  40 mg SubCUTAneous Q24H       Signed:  Jose Guadalupe Ray MD    Part of this note may have been written by using a voice dictation software.   The note has been proof read but may still contain some grammatical/other typographical errors.

## 2022-12-22 NOTE — DISCHARGE INSTRUCTIONS
[facility]        Patient Discharge Instructions    Lavell Closs / [id number] : 1950    Admitted 2022 Discharged: 2022     IF YOU HAVE ANY PROBLEMS ONCE YOU ARE AT HOME CALL THE FOLLOWING NUMBERS:   Main office number: [phone] ask for Aleksandra Breaux (medical assistant with Dr. Chaparro Posey)  [facility] [address] [facility], [address] [facility]          Activity  As tolerated and as directed by your doctor. Keep dressing dry and clean      Diet  Resume regular diet       Medications    The medications you are to continue on are listed on the medication reconciliation sheet. Narcotic pain medications as well as supplemental iron can cause constipation. If this occurs try stopping the narcotic pain medication and/or the iron. It is important that you take the medication exactly as they are prescribed. Keep your medication in the bottles provided by the pharmacist and keep a list of the medication names, dosages, and times to be taken in your wallet. Do not take other medications without consulting your doctor. Other Important Information    Do NOT smoke as this will greatly increase your risk of infection! Do not drive and operate hazardous machinery until being cleared to do so by your doctor     [facility] should be arranged for dressing changes. If you do not hear from them within two days of leaving the hospital please call the office. You are at a risk for falls. Use the rolling walker when walking. Patients should not drive if they are still taking narcotic pain mediation during the daytime hours. Most patients wean themselves off of pain medication within 2-5 weeks after surgery. Please give a list of your current medications to your Primary Care Provider and update this list whenever your medications are discontinued, doses are changed, or new medications (including over-the-counter products) are added.  Please carry medication information at all times in case of emergency situations. If you have sleep apnea and have a CPAP machine, please use it for all naps and sleeping. When to Call the office    - If you have a temperature greater then 101  - Excessive bleeding that does not stop after holding pressure over the area  - Excessive redness, swelling or bruising, and/ or green or yellow, smelly discharge from incision  - Uncontrolled vomiting   - Loose control of your bladder or bowel function  - Need a pain medication refill   - Need to change your follow up appointment     Information obtained by :  I understand that if any problems occur once I am at home I am to contact my physician. I understand and acknowledge receipt of the instructions indicated above.                                                                                                                                            Physician's or R.N.'s Signature                                                                  Date/Time                                                                                                                                              Patient or Representative Signature                                                          Date/Time

## 2022-12-22 NOTE — PLAN OF CARE
Problem: Discharge Planning  Goal: Discharge to home or other facility with appropriate resources  Outcome: Progressing  Flowsheets (Taken 12/22/2022 0810)  Discharge to home or other facility with appropriate resources: Identify barriers to discharge with patient and caregiver     Problem: Pain  Goal: Verbalizes/displays adequate comfort level or baseline comfort level  Outcome: Progressing  Flowsheets (Taken 12/22/2022 0810)  Verbalizes/displays adequate comfort level or baseline comfort level: Encourage patient to monitor pain and request assistance     Problem: Skin/Tissue Integrity  Goal: Absence of new skin breakdown  Description: 1. Monitor for areas of redness and/or skin breakdown  2. Assess vascular access sites hourly  3. Every 4-6 hours minimum:  Change oxygen saturation probe site  4. Every 4-6 hours:  If on nasal continuous positive airway pressure, respiratory therapy assess nares and determine need for appliance change or resting period.   Outcome: Progressing     Problem: Safety - Adult  Goal: Free from fall injury  Outcome: Progressing     Problem: ABCDS Injury Assessment  Goal: Absence of physical injury  Outcome: Progressing

## 2022-12-22 NOTE — PROGRESS NOTES
ACUTE OCCUPATIONAL THERAPY GOALS:   (Developed with and agreed upon by patient and/or caregiver.)  1. Patient will complete lower body bathing and dressing with minimal assistance and adaptive equipment as needed. 2. Patient will complete toileting with minimal assistance. 3. Patient will tolerate 30 minutes of OT treatment with as needed rest breaks to increase activity tolerance for ADLs. 4. Patient will complete functional transfers with supervision and adaptive equipment as needed. Timeframe: 7 visits       OCCUPATIONAL THERAPY Initial Assessment and Daily Note       OT Visit Days: 1  Acknowledge Orders  Time  OT Charge Capture  Rehab Caseload Tracker      Donovan Brown is a 67 y.o. female   PRIMARY DIAGNOSIS: Closed right hip fracture, initial encounter ([facility] [address]  Fall, initial encounter [W19. XXXA]  Closed right hip fracture, initial encounter ([facility] [address] [S72.001A]  Closed intertrochanteric fracture of hip, right, initial encounter ([facility] [address] [S72.141A]  Procedure(s) (LRB):  RIGHT GAMMA NAIL  INSERTION (Right)  1 Day Post-Op  Reason for Referral: Pain in Right Hip (M25.551)  History of falling (Z91.81)  Inpatient: Payor: MEDICARE / Plan: MEDICARE PART A AND B / Product Type: *No Product type* /     ASSESSMENT:     REHAB RECOMMENDATIONS:   Recommendation to date pending progress:  Setting:  Short-term Rehab  Patient is hopeful to progress to [facility]     Equipment:    3 in 1 Bedside Commode  Rolling Walker     ASSESSMENT:  Ms. Bibiana Reyes is a 68 y/o F admitted after a fall, now POD #1 from above procedure and WBAT RLE. 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 Reports no pain at rest, but increasing hip pain with movement. Requires additional time and safety cues for activity. Patient was lightheaded in standing and likely orthostatic with a SBP drop from 109 to 87 and HR increase from 76 to 113 bpm (MD, RN notified). She is functioning below baseline.  Explained to patient that in order to regain full independence faster, she could benefit from short rehab stay. She is adamant that she wants to do her rehab @ home. Will follow and continue to assess her progress to see if that is realistic. [address] [facility] [address] [address] AM-PAC 6 Clicks Daily Activity Inpatient Short Form:    AM-PAC Daily Activity Inpatient   How much help for putting on and taking off regular lower body clothing?: A Lot  How much help for Bathing?: A Lot  How much help for Toileting?: A Little  How much help for putting on and taking off regular upper body clothing?: A Little  How much help for taking care of personal grooming?: A Little  How much help for eating meals?: A Little  AM-PAC Inpatient Daily Activity Raw Score: 16  AM-PAC Inpatient ADL T-Scale Score : 35.96  ADL Inpatient CMS 0-100% Score: 53.32  ADL Inpatient CMS G-Code Modifier : CK           SUBJECTIVE:     Ms. Samy Hightower states, \"I'm going home. My  needs me. \"     Social/Functional Lives With: Spouse  Type of Home: House  Home Layout: Two level  Home Access: Stairs to enter with rails, Elevator  Entrance Stairs - Number of Steps: 5  Bathroom Shower/Tub: Tub/Shower unit  Bathroom Toilet: Standard  Bathroom Equipment: 3-in-1 commode  Home Equipment: willy [address]zel, [address]  ADL Assistance: Independent  Homemaking Assistance: Independent  Ambulation Assistance: Independent  Transfer Assistance: Independent  Active : Yes  Mode of Transportation: Car  Occupation: Retired    OBJECTIVE:     Claudia Smith / Mariana Jordan / Kaylan Orn: IV    RESTRICTIONS/PRECAUTIONS:  Restrictions/Precautions: Fall Risk, Bed Alarm, Weight Bearing  Right Lower Extremity Weight Bearing: Weight Bearing As Tolerated    PAIN: VITALS / O2:   Pre Treatment:   Pain Assessment: None - Denies Pain      Post Treatment: none @ rest        Vitals      Position BP Heartrate   Reclining 109/65 mm Hg 78 bpm   Standing  100/58 mm Hg 103 bpm   Seated - Resting after stand 112/69 mm Hg 81 bpm   After Ambulation - Standing 87/71 mm Hg 113 bpm   5 minutes post activity - Seated 104/68 mm Hg 83 bpm       Oxygen            GROSS EVALUATION: INTACT IMPAIRED   (See Comments)   UE AROM [x] []   UE PROM [x] []   Strength [x]       Posture / Balance [] Posture: Good  Sitting - Static: Good  Sitting - Dynamic: Good  Standing - Static: Fair  Standing - Dynamic: Fair   Sensation []  NT BUE    Coordination []  NT BUE     Tone []  NT BUE     Edema [x]    Activity Tolerance []    Limited by lightheadedness, drop in BP   Hand Dominance R [x] L []      COGNITION/  PERCEPTION: INTACT IMPAIRED   (See Comments)   Orientation [x]     Vision [x]     Hearing [x]     Cognition  [] Overall Cognitive Status: Exceptions  Safety Judgement: Decreased awareness of need for assistance, Decreased awareness of need for safety  Insights: Decreased awareness of deficits   Perception []       MOBILITY: I Mod I S SBA CGA Min Mod Max Total  NT x2 Comments:   Bed Mobility    Rolling [] [] [] [] [] [] [] [] [] [x] []    Supine to Sit [] [] [] [] [] [x] [] [] [] [] [] Additional time    Scooting [] [] [] [] [] [x] [] [] [] [] []    Sit to Supine [] [] [] [] [] [] [] [] [] [x] []    Transfers    Sit to Stand [] [] [] [] [] [x] [] [] [] [] [x]    Bed to Chair [] [] [] [] [] [x] [] [] [] [] [x]    Stand to Sit [] [] [] [] [] [x] [] [] [] [] [x]    Tub/Shower [] [] [] [] [] [] [] [] [] [] []     Toilet [] [] [] [] [] [] [] [] [] [] []      [] [] [] [] [] [] [] [] [] [] []    I=Independent, Mod I=Modified Independent, S=Supervision/Setup, SBA=Standby Assistance, CGA=Contact Guard Assistance, Min=Minimal Assistance, Mod=Moderate Assistance, Max=Maximal Assistance, Total=Total Assistance, NT=Not Tested    ACTIVITIES OF DAILY LIVING: I Mod I S SBA CGA Min Mod Max Total NT Comments   BASIC ADLs:              Upper Body Bathing  [] [] [] [] [] [] [] [] [] []    Lower Body Bathing [] [] [] [] [] [] [] [] [] []    Toileting [] [] [] [] [] [] [] [] [] []    Upper Body Dressing [] [] [] [] [] [] [] [] [] [] Lower Body Dressing [] [] [] [] [] [x] [] [] [] [] Adaptive technique    Feeding [] [] [x] [] [] [] [] [] [] []    Grooming [] [] [] [] [] [] [] [] [] []    Personal Device Care [x] [] [] [] [] [] [] [] [] []    Functional Mobility [] [] [] [] [] [x] [] [] [] [] RW   I=Independent, Mod I=Modified Independent, S=Supervision/Setup, SBA=Standby Assistance, CGA=Contact Guard Assistance, Min=Minimal Assistance, Mod=Moderate Assistance, Max=Maximal Assistance, Total=Total Assistance, NT=Not Tested    PLAN:   [facility] of Care: 5 times/week for duration of hospital stay or until stated goals are met, whichever comes first.    PROBLEM LIST:   (Skilled intervention is medically necessary to address:)  Decreased ADL/Functional Activities  Decreased Activity Tolerance  Decreased AROM/PROM  Decreased Balance  Decreased Gait Ability  Decreased Safety Awareness  Decreased Transfer Abilities  Increased Pain   INTERVENTIONS PLANNED:  (Benefits and precautions of occupational therapy have been discussed with the patient.)  Self Care Training  Therapeutic Activity  Therapeutic Exercise/HEP  Neuromuscular Re-education  Manual Therapy  Education         TREATMENT:     EVALUATION: LOW COMPLEXITY: (Untimed Charge)    TREATMENT:   Co-Treatment PT/OT necessary due to patient's decreased overall endurance/tolerance levels, as well as need for high level skilled assistance to complete functional transfers/mobility and functional tasks  Self Care (23 minutes): Patient participated in lower body dressing and grooming ADLs in long sitting and standing with maximal verbal cueing to increase independence, decrease assistance required, and increase activity tolerance. Patient also participated in functional mobility, functional transfer, and adaptive equipment training to increase independence and increase activity tolerance.      TREATMENT GRID:  N/A    AFTER TREATMENT PRECAUTIONS: Bed/Chair Locked, Call light within reach, Chair, RN notified, Visitors at bedside, and MD @ bedside    INTERDISCIPLINARY COLLABORATION:  MD/ PA/ NP , RN/ PCT, PT/ PTA, OT/ BASILIO, and RN Case Manager/      EDUCATION:  Education Given To: Patient; Family  Education Provided: Role of Therapy;Plan of Care  Education Outcome: Verbalized understanding    TOTAL TREATMENT DURATION AND TIME:  Time In: 5501  Time Out: 6851  Minutes: [address]

## 2022-12-22 NOTE — PROGRESS NOTES
[facility]        2022         Post Op day: 1 Day Post-Op Procedure(s) (LRB):  RIGHT GAMMA NAIL  INSERTION (Right)      Admit Date: 2022  Admit Diagnosis: Fall, initial encounter [W19. XXXA]  [facility] [S72.001A]  Closed intertrochanteric fracture of hip, right, initial encounter ([facility] [S72.141A]       Principle Problem: Closed right hip fracture, initial encounter [facility] Subjective: Doing well, pain well-controlled. C/o some mild SOB on exertion. Hgb = 7.7. No Chest Pain, No Nausea or Vomiting     Objective:   Vital Signs are Stable, No Acute Distress, Alert,  Dressing is Dry,  Neurovascular exam is normal.     Assessment / Plan :  Patient Active Problem List   Diagnosis    Closed right hip fracture, initial [facility]    Hypercholesterolemia    Essential hypertension    Patient Vitals for the past 8 hrs:   BP Temp Temp src Pulse Resp SpO2   22 0746 108/85 98.6 °F (37 °C) Oral 69 16 93 %   22 0324 (!) 115/91 98.4 °F (36.9 °C) Oral 86 20 94 %    Temp (24hrs), Av.5 °F (36.9 °C), Min:98.2 °F (36.8 °C), Max:98.8 °F (37.1 °C)    Body mass index is 21.97 kg/m².     Lab Results   Component Value Date/Time    HGB 7.7 2022 04:29 AM          S/P Procedure(s) (LRB):  RIGHT GAMMA NAIL  INSERTION (Right)      Vit D 2000iu daily x 12 weeks   Medical Mgmt per hospitalist  --monitoring hgb  Anticoagulation plan: aspirin 325mg x4 weeks or continue anticoagulation prior to surgery   Continue PT  Fall Precautions  DC disp: home tomorrow pending hgb comes up  F/U: 2 weeks postop for wound check and staple removal        Signed By: SOFIA Colunga  2022,  9:50 AM

## 2022-12-23 VITALS
DIASTOLIC BLOOD PRESSURE: 79 MMHG | OXYGEN SATURATION: 95 % | HEIGHT: 64 IN | HEART RATE: 82 BPM | RESPIRATION RATE: 18 BRPM | TEMPERATURE: 99.5 F | WEIGHT: 128 LBS | SYSTOLIC BLOOD PRESSURE: 117 MMHG | BODY MASS INDEX: 21.85 KG/M2

## 2022-12-23 LAB
ANION GAP SERPL CALC-SCNC: 3 MMOL/L (ref 2–11)
BASOPHILS # BLD: 0 K/UL (ref 0–0.2)
BASOPHILS NFR BLD: 0 % (ref 0–2)
BUN SERPL-MCNC: 11 MG/DL (ref 8–23)
CALCIUM SERPL-MCNC: 8.7 MG/DL (ref 8.3–10.4)
CHLORIDE SERPL-SCNC: 108 MMOL/L (ref 101–110)
CO2 SERPL-SCNC: 30 MMOL/L (ref 21–32)
CREAT SERPL-MCNC: 0.5 MG/DL (ref 0.6–1)
DIFFERENTIAL METHOD BLD: ABNORMAL
EOSINOPHIL # BLD: 0.1 K/UL (ref 0–0.8)
EOSINOPHIL NFR BLD: 1 % (ref 0.5–7.8)
ERYTHROCYTE [DISTWIDTH] IN BLOOD BY AUTOMATED COUNT: 13.4 % (ref 11.9–14.6)
GLUCOSE SERPL-MCNC: 101 MG/DL (ref 65–100)
HCT VFR BLD AUTO: 22.3 % (ref 35.8–46.3)
HCT VFR BLD AUTO: 27.1 % (ref 35.8–46.3)
HGB BLD-MCNC: 7.1 G/DL (ref 11.7–15.4)
HGB BLD-MCNC: 8.9 G/DL (ref 11.7–15.4)
HISTORY CHECK: NORMAL
IMM GRANULOCYTES # BLD AUTO: 0 K/UL (ref 0–0.5)
IMM GRANULOCYTES NFR BLD AUTO: 1 % (ref 0–5)
LYMPHOCYTES # BLD: 1.6 K/UL (ref 0.5–4.6)
LYMPHOCYTES NFR BLD: 24 % (ref 13–44)
MCH RBC QN AUTO: 34.5 PG (ref 26.1–32.9)
MCHC RBC AUTO-ENTMCNC: 31.8 G/DL (ref 31.4–35)
MCV RBC AUTO: 108.3 FL (ref 82–102)
MONOCYTES # BLD: 0.8 K/UL (ref 0.1–1.3)
MONOCYTES NFR BLD: 13 % (ref 4–12)
NEUTS SEG # BLD: 4.1 K/UL (ref 1.7–8.2)
NEUTS SEG NFR BLD: 62 % (ref 43–78)
NRBC # BLD: 0 K/UL (ref 0–0.2)
PLATELET # BLD AUTO: 159 K/UL (ref 150–450)
PMV BLD AUTO: 10.5 FL (ref 9.4–12.3)
POTASSIUM SERPL-SCNC: 4 MMOL/L (ref 3.5–5.1)
RBC # BLD AUTO: 2.06 M/UL (ref 4.05–5.2)
SODIUM SERPL-SCNC: 141 MMOL/L (ref 133–143)
WBC # BLD AUTO: 6.6 K/UL (ref 4.3–11.1)

## 2022-12-23 PROCEDURE — 86923 COMPATIBILITY TEST ELECTRIC: CPT

## 2022-12-23 PROCEDURE — 86901 BLOOD TYPING SEROLOGIC RH(D): CPT

## 2022-12-23 PROCEDURE — 6370000000 HC RX 637 (ALT 250 FOR IP): Performed by: ORTHOPAEDIC SURGERY

## 2022-12-23 PROCEDURE — 36415 COLL VENOUS BLD VENIPUNCTURE: CPT

## 2022-12-23 PROCEDURE — 85025 COMPLETE CBC W/AUTO DIFF WBC: CPT

## 2022-12-23 PROCEDURE — 85014 HEMATOCRIT: CPT

## 2022-12-23 PROCEDURE — 97530 THERAPEUTIC ACTIVITIES: CPT

## 2022-12-23 PROCEDURE — 2580000003 HC RX 258: Performed by: HOSPITALIST

## 2022-12-23 PROCEDURE — 36430 TRANSFUSION BLD/BLD COMPNT: CPT

## 2022-12-23 PROCEDURE — P9016 RBC LEUKOCYTES REDUCED: HCPCS

## 2022-12-23 PROCEDURE — 80048 BASIC METABOLIC PNL TOTAL CA: CPT

## 2022-12-23 RX ORDER — OXYCODONE HYDROCHLORIDE 5 MG/1
5 TABLET ORAL EVERY 4 HOURS PRN
Qty: 30 TABLET | Refills: 0 | Status: SHIPPED | OUTPATIENT
Start: 2022-12-23 | End: 2022-12-28

## 2022-12-23 RX ORDER — SODIUM CHLORIDE 9 MG/ML
INJECTION, SOLUTION INTRAVENOUS PRN
Status: DISCONTINUED | OUTPATIENT
Start: 2022-12-23 | End: 2022-12-23 | Stop reason: HOSPADM

## 2022-12-23 RX ORDER — OXYCODONE HYDROCHLORIDE 5 MG/1
5 TABLET ORAL EVERY 4 HOURS PRN
Qty: 30 TABLET | Refills: 0 | Status: SHIPPED | OUTPATIENT
Start: 2022-12-23 | End: 2022-12-23 | Stop reason: SDUPTHER

## 2022-12-23 RX ADMIN — SODIUM CHLORIDE, PRESERVATIVE FREE 5 ML: 5 INJECTION INTRAVENOUS at 10:30

## 2022-12-23 RX ADMIN — OXYCODONE 10 MG: 5 TABLET ORAL at 17:33

## 2022-12-23 RX ADMIN — ASPIRIN 325 MG: 325 TABLET, COATED ORAL at 09:19

## 2022-12-23 RX ADMIN — ATORVASTATIN CALCIUM 10 MG: 10 TABLET, FILM COATED ORAL at 09:19

## 2022-12-23 ASSESSMENT — PAIN DESCRIPTION - LOCATION
LOCATION: HIP
LOCATION: HIP

## 2022-12-23 ASSESSMENT — PAIN DESCRIPTION - DESCRIPTORS: DESCRIPTORS: SHOOTING

## 2022-12-23 ASSESSMENT — PAIN SCALES - GENERAL
PAINLEVEL_OUTOF10: 3
PAINLEVEL_OUTOF10: 4

## 2022-12-23 ASSESSMENT — PAIN DESCRIPTION - ORIENTATION
ORIENTATION: RIGHT
ORIENTATION: RIGHT

## 2022-12-23 ASSESSMENT — PAIN - FUNCTIONAL ASSESSMENT: PAIN_FUNCTIONAL_ASSESSMENT: ACTIVITIES ARE NOT PREVENTED

## 2022-12-23 NOTE — CONSENT
I have discussed with the patient the rationale for blood component transfusion; its benefits in treating or preventing fatigue, organ damage, or death; and its risk which includes mild transfusion reactions, rare risk of blood borne infection, or more serious but rare reactions. I have discussed the alternatives to transfusion, including the risk and consequences of not receiving transfusion. The patient had an opportunity to ask questions and had agreed to proceed with transfusion of blood components.      Eli Perera MD

## 2022-12-23 NOTE — PROGRESS NOTES
ACUTE PHYSICAL THERAPY GOALS:   (Developed with and agreed upon by patient and/or caregiver.)  (1.) Nathalia Broussard  will move from supine to sit and sit to supine , scoot up and down, and roll side to side with MODIFIED INDEPENDENCE within 7 treatment day(s). (2.) Nathalia Broussard will transfer from bed to chair and chair to bed with MODIFIED INDEPENDENCE using the least restrictive device within 7 treatment day(s). (3.) Nathalia Broussard will ambulate with MODIFIED INDEPENDENCE for 200 feet with the least restrictive device within 7 treatment day(s). (4.) Nathalia Broussard will perform standing static and dynamic balance activities x 25 minutes with MODIFIED INDEPENDENCE to improve safety within 7 treatment day(s). (5.) Nathalia Broussard will ascend and descend 5 stairs using bilateral hand rail(s) with MODIFIED INDEPENDENCE to improve functional mobility and safety within 7 treatment day(s). (6.) Nathalia Broussard will perform therapeutic exercises x 20 min for HEP with INDEPENDENCE to improve strength, endurance, and functional mobility within 7 treatment day(s). PHYSICAL THERAPY: Daily Note PM   (Link to Caseload Tracking: PT Visit Days : 2  Time In/Out PT Charge Capture  Rehab Caseload Tracker  Orders    Nathalia Broussard is a 67 y.o. female   PRIMARY DIAGNOSIS: Closed right hip fracture, initial encounter ([facility]  Fall, initial encounter [W19. XXXA]  Closed right hip fracture, initial encounter ([facility] [S72.001A]  Closed intertrochanteric fracture of hip, right, initial encounter ([facility] [S72.141A]  Procedure(s) (LRB):  RIGHT GAMMA NAIL  INSERTION (Right)  2 Days Post-Op  Inpatient: Payor: Ramona Meredith / Plan: MEDICARE PART A AND B / Product Type: *No Product type* /     ASSESSMENT:     REHAB RECOMMENDATIONS:   Recommendation to date pending progress:  Setting:  Home Health Therapy    Equipment:    Rolling Walker     ASSESSMENT:  Ms. Radha Barron is a 67year old F who presents s/p R gamma nailing after she sustained a fx after a fall when she got tripped up on her dog. WBAT RLE. The patient demonstrated good progress toward goals with increased gait distances and improved activity tolerance. She increased gait distances with the RW and cueing for gait technique and posture. She becomes very anxious and needs additional time and cueing for pacing and relaxation techniques. Performed self-care at the sink with SBA and multiple transfers.       SUBJECTIVE:   Ms. Danette Aguilar states, \"I'll go back to the room now\"     Social/Functional Lives With: Spouse  Type of Home: House  Home Layout: Two level  Home Access: Stairs to enter with rails, Elevator  Entrance Stairs - Number of Steps: 5  Bathroom Shower/Tub: Tub/Shower unit  Bathroom Toilet: Standard  Bathroom Equipment: 3-in-1 commode  Home Equipment: [employer], standard, [employer]  ADL Assistance: Independent  Homemaking Assistance: Independent  Ambulation Assistance: Independent  Transfer Assistance: Independent  Active : Yes  Mode of Transportation: Car  Occupation: Retired  OBJECTIVE:     PAIN: VITALS / O2: PRECAUTION / Elian Bethea / Ethan Jurist:   Pre Treatment: 0/10 at rest, 10/10 with mobility per pt report         Post Treatment: Same as above Vitals        Oxygen    IV    RESTRICTIONS/PRECAUTIONS:  Restrictions/Precautions  Restrictions/Precautions: Fall Risk, Bed Alarm, Weight Bearing  Lower Extremity Weight Bearing Restrictions  Right Lower Extremity Weight Bearing: Weight Bearing As Tolerated  Restrictions/Precautions: Fall Risk, Bed Alarm, Weight Bearing     MOBILITY: I Mod I S SBA CGA Min Mod Max Total  NT x2 Comments:   Bed Mobility    Rolling [] [] [] [] [x] [] [] [] [] [] []    Supine to Sit [] [] [] [] [x] [x] [] [] [] [] []    Scooting [] [] [] [] [x] [] [] [] [] [] []    Sit to Supine [] [] [] [] [] [] [] [] [] [] []    Transfers    Sit to Stand [] [] [] [] [x] [x] [] [] [] [] []  Inc assist from lower surface   Bed to Chair [] [] [] [] [x] [] [] [] [] [] []    Stand to Sit [] [] [] [] [x] [] [] [] [] [] []     [] [] [] [] [] [] [] [] [] [] []    I=Independent, Mod I=Modified Independent, S=Supervision, SBA=Standby Assistance, CGA=Contact Guard Assistance,   Min=Minimal Assistance, Mod=Moderate Assistance, Max=Maximal Assistance, Total=Total Assistance, NT=Not Tested    BALANCE: Good Fair+ Fair Fair- Poor NT Comments   Sitting Static [x] [] [] [] [] []    Sitting Dynamic [x] [] [] [] [] []              Standing Static [] [x] [] [] [] []    Standing Dynamic [] [x] [x] [] [] []      GAIT: I Mod I S SBA CGA Min Mod Max Total  NT x2 Comments:   Level of Assistance [] [] [] [] [x] [] [] [] [] [] []    Distance 3x40  feet    DME Gait Belt and Rolling Walker    Gait Quality Antalgic    Weightbearing Status      Stairs      I=Independent, Mod I=Modified Independent, S=Supervision, SBA=Standby Assistance, CGA=Contact Guard Assistance,   Min=Minimal Assistance, Mod=Moderate Assistance, Max=Maximal Assistance, Total=Total Assistance, NT=Not Tested    PLAN:   FREQUENCY AND DURATION: BID for duration of hospital stay or until stated goals are met, whichever comes first.    TREATMENT:   TREATMENT:   Therapeutic Activity (40 Minutes): Therapeutic activity included Supine to Sit, Scooting, Transfer Training, Ambulation on level ground, Sitting balance , and Standing balance to improve functional Activity tolerance, Balance, Coordination, Mobility, Strength, and ROM.     TREATMENT GRID:  N/A    AFTER TREATMENT PRECAUTIONS: Bed/Chair Locked, Call light within reach, Chair, Needs within reach, and RN notified    INTERDISCIPLINARY COLLABORATION:  RN/ PCT and PT/ PTA    EDUCATION:      TIME IN/OUT:  Time In: 0231  Time Out: 272 [facility]  Minutes: 1538 Maxwellsharlene Torres PTA

## 2022-12-23 NOTE — DISCHARGE SUMMARY
Hospitalist Discharge Summary   Admit Date:  2022  8:13 PM   DC Note date: 2022  Name:  Paola Mcguire   Age:  67 y.o. Sex:  female  :  1950   MRN:  [id number]   Room:  [facility]  PCP:  Shaq Aggarwal MD    Presenting Complaint: Fall     Initial Admission Diagnosis: Fall, initial encounter [W19. XXXA]  Closed right hip fracture, initial encounter ([facility] [S72.001A]  Closed intertrochanteric fracture of hip, right, initial encounter ([facility] [S72.141A]     Problem List for this Hospitalization (present on admission):    Principal Problem:    Closed right hip fracture, initial encounter [facility])  Active Problems:    Hypercholesterolemia    Essential hypertension  Resolved Problems:    * No resolved hospital problems. [facility] Course:  Ms. Danette Aguilar is a nice 66 y/o WF with a h/o HTN and HLD who was admitted to our service on  after a fall at home. She tripped over her dog and landed on her R hip. Imaging in the ER showed a R intertrochanteric hip fracture. Orthopedic Surgery was consulted and she underwent gamma nail placement on . Hb dropped post-op. On admission her Hb was 13 and was down to 7.1 today  and felt due to post-op hip surgery. She was recommended for STR but declined and prefers to go home with [facility]. She was transfused 1U RBCs today given the drop in her Hb from admission and that she has been orthostatic. She can resume her home BP meds tomorrow but advised to keep a home BP log and hold medications if SBP </= 100-105. She will con't ASA 352mg daily x4 weeks for DVT ppx. She will follow up with Orthopedic Surgery outpatient. I discussed this plan with patient and her son at bedside and both are agreeable. She is stable for discharge home this afternoon after her blood transfusion is complete. Disposition: Home with [facility]  Diet: ADULT DIET;  Regular  Code Status: Full Code    Follow Ups:   Contact information for after-discharge care     Discharge Durable Medical Equipment     [facility] . Service: Durable Medical Equipment  Contact information:  [address] [facility] [address] [phone]                 Discharge [address] [facility] . Service: [address] [place] [address] information:  [address] [facility] [address]  [phone]                           Time spent in patient discharge and coordination 35 minutes. Follow up labs/diagnostics (ultimately defer to outpatient provider):  Recommend CBC in ~2 weeks outpatient    Plan was discussed with patient and son, RN, CM. All questions answered. Patient was stable at time of discharge. Instructions given to call a physician or return if any concerns.     Current Discharge Medication List        CONTINUE these medications which have CHANGED    Details   aspirin 325 MG EC tablet Take 1 tablet by mouth daily  Qty: 30 tablet, Refills: 0           CONTINUE these medications which have NOT CHANGED    Details   amLODIPine (NORVASC) 5 MG tablet Take 5 mg by mouth daily      atorvastatin (LIPITOR) 10 MG tablet Take 10 mg by mouth      metoprolol succinate (TOPROL XL) 100 MG extended release tablet Take 50 mg by mouth daily      famotidine (PEPCID) 40 MG tablet Take 40 mg by mouth 2 times daily      zolpidem (AMBIEN) 10 MG tablet Take 10 mg by mouth.      calcium carbonate (OSCAL) 500 MG TABS tablet Take 1,000 mg by mouth daily      Cholecalciferol (VITAMIN D3) 50 MCG (2000 UT) CAPS Take 3 capsules by mouth      cyanocobalamin 1000 MCG tablet Take 1,000 mcg by mouth daily           STOP taking these medications       oxyCODONE-acetaminophen (PERCOCET) 7.5-325 MG per tablet Comments:   Reason for Stopping:               Procedures done this admission:  Procedure(s):  RIGHT GAMMA NAIL  INSERTION    Consults this admission:  IP CONSULT TO [address] [place] [address]    Echocardiogram results:  No results found for this or any previous visit. Diagnostic Imaging/Tests:   XR HIP RIGHT (2-3 VIEWS)    Result Date: 12/21/2022  Status post ORIF transfixing the intertrochanteric right femoral neck fracture. XR HIP RIGHT (2-3 VIEWS)    Result Date: 12/19/2022  Intertrochanteric right hip fracture     XR FEMUR RIGHT (MIN 2 VIEWS)    Result Date: 12/19/2022  Intertrochanteric right hip fracture    XR TIBIA FIBULA RIGHT (2 VIEWS)    Result Date: 12/19/2022  Somewhat limited views due to positioning. No definite acute fracture    XR CHEST PORTABLE    Result Date: 12/19/2022  1.  2 small nodules in the right upper lung, most likely granulomas. 2.  Possible aortic aneurysm. Recommend CT to evaluate both the lung nodules and the aortic arch.         Labs: Results:       BMP, Mg, Phos Recent Labs     12/21/22  0504 12/23/22  0438    141   K 3.7 4.0    108   CO2 30 30   ANIONGAP 1* 3   BUN 12 11   CREATININE 0.50* 0.50*   LABGLOM >60 >60   CALCIUM 8.2* 8.7   GLUCOSE 105* 101*      CBC Recent Labs     12/21/22  0504 12/22/22  0429 12/23/22  0438   WBC 6.6  --  6.6   RBC 2.96*  --  2.06*   HGB 10.2* 7.7* 7.1*   HCT 31.8* 24.1* 22.3*   .4*  --  108.3*   MCH 34.5*  --  34.5*   MCHC 32.1  --  31.8   RDW 13.7  --  13.4     --  159   MPV 10.0  --  10.5   NRBC 0.00  --  0.00   SEGS 62  --  62   LYMPHOPCT 22  --  24   EOSRELPCT 2  --  1   MONOPCT 14*  --  13*   BASOPCT 1  --  0   IMMGRAN 0  --  1   SEGSABS 4.1  --  4.1   LYMPHSABS 1.4  --  1.6   EOSABS 0.1  --  0.1   MONOSABS 0.9  --  0.8   BASOSABS 0.0  --  0.0   ABSIMMGRAN 0.0  --  0.0      LFT Recent Labs     12/21/22  0504   BILITOT 1.1   ALKPHOS 55   AST 15   ALT 18   PROT 6.1*   LABALBU 3.0*   GLOB 3.1      Cardiac  No results found for: NTPROBNP, TROPHS   Coags No results found for: PROTIME, INR, APTT   A1c No results found for: LABA1C, EAG   Lipids No results found for: CHOL, LDLCALC, LABVLDL, HDL, CHOLHDLRATIO, TRIG   Thyroid  No results found for: Ryan Diaz     Most Recent UA Lab Results   Component Value Date/Time    COLORU YELLOW/STRAW 12/20/2022 02:14 AM    APPEARANCE CLEAR 12/20/2022 02:14 AM    SPECGRAV 1.021 12/20/2022 02:14 AM    LABPH 6.0 12/20/2022 02:14 AM    PROTEINU Negative 12/20/2022 02:14 AM    GLUCOSEU Negative 12/20/2022 02:14 AM    KETUA 80 12/20/2022 02:14 AM    BILIRUBINUR Negative 12/20/2022 02:14 AM    BLOODU Negative 12/20/2022 02:14 AM    UROBILINOGEN 0.2 12/20/2022 02:14 AM    NITRU Negative 12/20/2022 02:14 AM    LEUKOCYTESUR Negative 12/20/2022 02:14 AM        No results for input(s): CULTURE in the last 720 hours.     All Labs from Last 24 Hrs:  Recent Results (from the past 24 hour(s))   CBC with Auto Differential    Collection Time: 12/23/22  4:38 AM   Result Value Ref Range    WBC 6.6 4.3 - 11.1 K/uL    RBC 2.06 (L) 4.05 - 5.2 M/uL    Hemoglobin 7.1 (L) 11.7 - 15.4 g/dL    Hematocrit 22.3 (L) 35.8 - 46.3 %    .3 (H) 82 - 102 FL    MCH 34.5 (H) 26.1 - 32.9 PG    MCHC 31.8 31.4 - 35.0 g/dL    RDW 13.4 11.9 - 14.6 %    Platelets 163 950 - 806 K/uL    MPV 10.5 9.4 - 12.3 FL    nRBC 0.00 0.0 - 0.2 K/uL    Differential Type AUTOMATED      Seg Neutrophils 62 43 - 78 %    Lymphocytes 24 13 - 44 %    Monocytes 13 (H) 4.0 - 12.0 %    Eosinophils % 1 0.5 - 7.8 %    Basophils 0 0.0 - 2.0 %    Immature Granulocytes 1 0.0 - 5.0 %    Segs Absolute 4.1 1.7 - 8.2 K/UL    Absolute Lymph # 1.6 0.5 - 4.6 K/UL    Absolute Mono # 0.8 0.1 - 1.3 K/UL    Absolute Eos # 0.1 0.0 - 0.8 K/UL    Basophils Absolute 0.0 0.0 - 0.2 K/UL    Absolute Immature Granulocyte 0.0 0.0 - 0.5 K/UL   Basic Metabolic Panel w/ Reflex to MG    Collection Time: 12/23/22  4:38 AM   Result Value Ref Range    Sodium 141 133 - 143 mmol/L    Potassium 4.0 3.5 - 5.1 mmol/L    Chloride 108 101 - 110 mmol/L    CO2 30 21 - 32 mmol/L    Anion Gap 3 2 - 11 mmol/L    Glucose 101 (H) 65 - 100 mg/dL    BUN 11 8 - 23 MG/DL    Creatinine 0.50 (L) 0.6 - 1.0 MG/DL    Est, Glom Filt Rate >60 >60 ml/min/1.73m2    Calcium 8.7 8.3 - 10.4 MG/DL   PREPARE RBC (CROSSMATCH), 1 Units    Collection Time: 12/23/22  8:15 AM   Result Value Ref Range    History Check Historical check performed    TYPE AND SCREEN    Collection Time: 12/23/22  8:39 AM   Result Value Ref Range    Crossmatch expiration date 12/26/2022,2359     ABO/Rh O POSITIVE     Antibody Screen NEG     Unit Number [id number]     Product Code Blood Bank RC LR     Unit Divison 00     Dispense Status Blood Bank ISSUED     Crossmatch Result Compatible        Allergies   Allergen Reactions    Penicillins Anaphylaxis and Swelling    [place] Anaphylaxis and Hives     There is no immunization history for the selected administration types on file for this patient. Recent Vital Data:  Patient Vitals for the past 24 hrs:   Temp Pulse Resp BP SpO2   12/23/22 1157 99.9 °F (37.7 °C) 85 17 (!) 121/59 94 %   12/23/22 1137 98.1 °F (36.7 °C) 85 16 89/60 94 %   12/23/22 0814 99.1 °F (37.3 °C) 96 16 108/68 93 %   12/23/22 0324 98.6 °F (37 °C) 89 18 124/85 93 %   12/22/22 2347 98.8 °F (37.1 °C) 87 20 113/77 93 %   12/22/22 2023 98.6 °F (37 °C) 87 18 100/61 94 %   12/22/22 1524 98.8 °F (37.1 °C) 81 17 101/63 96 %   12/22/22 1407 -- -- -- 124/72 --       Oxygen Therapy  SpO2: 94 %  Pulse via Oximetry: 83 beats per minute  Pulse Oximeter Device Mode: Intermittent  Pulse Oximeter Device Location: Finger  O2 Device: Nasal cannula  O2 Flow Rate (L/min): 2 L/min    Estimated body mass index is 21.97 kg/m² as calculated from the following:    Height as of this encounter: 5' 4\" (1.626 m). Weight as of this encounter: 128 lb (58.1 kg). Intake/Output Summary (Last 24 hours) at 12/23/2022 1356  Last data filed at 12/23/2022 1355  Gross per 24 hour   Intake 720 ml   Output --   Net 720 ml         Physical Exam:  General:          Well nourished. No overt distress.   Head:               Normocephalic, atraumatic  Eyes:               Sclerae appear normal. Pupils equally round. ENT:                Nares appear normal, no drainage. Moist oral mucosa  Neck:               No restricted ROM. Trachea midline. CV:                  RRR. No m/r/g. No jugular venous distension. Lungs:             CTAB. No wheezing, rhonchi, or rales. Respirations even, unlabored. Abdomen: Bowel sounds present. Soft, nontender, nondistended. Extremities:     No cyanosis or clubbing. No edema. Surgical bandage to R hip, no obvious edema, fluctuance, crepitus or bruising. Skin:                No rashes and normal coloration. Warm and dry. Neuro:             CN II-XII grossly intact. Sensation intact. A&Ox3  Psych:             Normal mood and affect. Signed:  Valdo Freire MD    Part of this note may have been written by using a voice dictation software. The note has been proof read but may still contain some grammatical/other typographical errors.

## 2022-12-23 NOTE — CARE COORDINATION
Pt is medically cleared for dc to home today with [place] RN/PT/OT services through [facility]. Fixed wheel rolling walker supplied by [address] delivered to room. Consent signed and faxed to their office. No other dc needs or concerns identified or reported. CM remains available to assist as needed. 12/22/22 1331   Service Assessment   Patient Orientation Alert and Oriented   Cognition Alert   History Provided By Patient;Medical Record   Primary Caregiver Self   Accompanied By/Relationship Son/Suraj   Support Systems Spouse/Significant Other;Children; Other (Comment); Family Members  ([place]   [facility] is: Named in [address] [place] [address]  (Son Giuliana Varner [phone] POA)   PCP Verified by CM Yes  (Dr. Ban Olson)   Last Visit to PCP Within last 6 months   Prior Functional Level Independent in ADLs/IADLs   Current Functional Level Assistance with the following:;Mobility; Independent in ADLs/IADLs   Can patient return to prior living arrangement Yes   Ability to make needs known: Good   Family able to assist with home care needs: Yes   Would you like for me to discuss the discharge plan with any other family members/significant others, and if so, who? No   Financial Resources [place] ([place])   Community Resources None   Social/Functional History   Lives With Spouse   Type of Home House   Home Layout Two level   Home Access Stairs to enter with rails;Elevator   Entrance Stairs - Number of Steps 5   Bathroom Shower/Tub Tub/Shower unit   Bathroom Toilet Standard   Bathroom Equipment 3-in-1 commode   Home Equipment Cane;Walker, standard; [place] Assistance Independent   Ambulation Assistance Independent   Transfer Assistance Independent   Active  Yes   Mode of Transportation Car   Occupation Retired   Discharge Planning   Type of [address] Prior To Admission None   Potential Assistance Needed Home Care;Durable Medical Equipment   Potential DME Needed Walker   DME Ordered? Rebit  ([facility] from [place] [address])   Type of [address] OT;PT;Nursing Services   Patient expects to be discharged to: House   One/Two Story Residence Two story   # of Interior Steps 4   History of falls? 1   Services At/After Discharge   Transition of Care Consult (CM Consult) Home Health;DME/Supply Assistance   Internal Home Health No   Reason Outside Agency Chosen Unable to staff case;Script used patient chose alternate agency   Services At/After Discharge OT;PT;Home Health;DME;Nursing services   [facility] Information Provided? No   Mode of Transport at Discharge Other (see comment)  (family)   Confirm Follow Up Transport Family   Condition of Participation: Discharge Planning   The Plan for Transition of Care is related to the following treatment goals: [facility] nursing and therapy services to support pt's care and recovery in the home after dc   The Patient and/or Patient Representative was provided with a Choice of Provider? Patient   The Patient and/Or Patient Representative agree with the Discharge Plan? Yes   Freedom of Choice list was provided with basic dialogue that supports the patient's individualized plan of care/goals, treatment preferences, and shares the quality data associated with the providers?   Yes

## 2022-12-23 NOTE — PROGRESS NOTES
[facility]        2022         Post Op day: 2 Days Post-Op Procedure(s) (LRB):  RIGHT GAMMA NAIL  INSERTION (Right)      Admit Date: 2022  Admit Diagnosis: Fall, initial encounter [W19. XXXA]  Closed right hip fracture, initial encounter ([facility] [S72.001A]  Closed intertrochanteric fracture of hip, right, initial encounter ([facility] [S72.141A]       Principle Problem: Closed right hip fracture, [facility] encounter [facility] Subjective: Doing well, pain well-controlled. C/o some mild SOB on exertion. Hgb = 7.7 yesterday and 7.1 today. Hospitalist ordered transfusion. No Chest Pain, No Nausea or Vomiting     Objective:   Vital Signs are Stable, No Acute Distress, Alert,  Dressing is Dry,  Neurovascular exam is normal.     Assessment / Plan :  Patient Active Problem List   Diagnosis    Closed right hip fracture, initial encounter ([facility]    Hypercholesterolemia    Essential hypertension    Patient Vitals for the past 8 hrs:   BP Temp Temp src Pulse Resp SpO2   22 0814 108/68 99.1 °F (37.3 °C) Oral 96 16 93 %   22 0324 124/85 98.6 °F (37 °C) Oral 89 18 93 %    Temp (24hrs), Av.8 °F (37.1 °C), Min:98.6 °F (37 °C), Max:99.1 °F (37.3 °C)    Body mass index is 21.97 kg/m². Lab Results   Component Value Date/Time    HGB 7.1 2022 04:38 AM          S/P Procedure(s) (LRB):  RIGHT GAMMA NAIL  INSERTION (Right)      Vit D 2000iu daily x 12 weeks   Medical Mgmt per hospitalist  --monitoring hgb; will get transfusion today  Anticoagulation plan: aspirin 325mg x4 weeks   Continue PT  Fall Precautions  DC disp: Stable from ortho standpoint.  Home with [facility] when medically stable per hospitalist.  F/U: 2 weeks postop for wound check and staple removal        Signed By: SOFIA Redding  2022,  10:00 AM

## 2022-12-23 NOTE — PROGRESS NOTES
Hospitalist Progress Note   Admit Date:  2022  8:13 PM   Name:  Dennis Meza   Age:  67 y.o. Sex:  female  :  1950   MRN:  [id number]   Room:  9/    Reason(s) for Admission: Fall, initial encounter [W19. XXXA]  Closed right hip fracture, initial encounter ([facility] [address] [S72.001A]  Closed intertrochanteric fracture of hip, right, initial encounter [facility] Course & Interval History:   Ms. Glendy Toscano is a nice 68 y/o WF with a h/o HTN, HLD who was admitted to our service on  after a fall at home. Tripped over her dog and landed on her R hip. Imaging in ER showed a R intertrochanteric hip fracture. Ortho consulted and she underwent gamma nail placement on . Hb dropped post-op. Recommended for STR but declining and prefers to go home. Subjective/24hr Events (22): Feels well, said she got to [facility] with RW and assistance. No melena or other bleeding noted. Hb down to 7.1 today. Had some dizziness/orthostasis with ambulation yesterday. Needs to use BR again, RN notified. No chest pain or SOB. Assessment & Plan:   # Post-operative blood loss anemia   - Hb 13 on admission, steady drop since surgery . Down to 7.1 on , transfuse 1U RBCs today. No other source of blood loss obvious or suspected. # R intertrochanteric hip fracture   - S/p mechanical fall at home. - Gamma nail insertion     # HLD   - Statin    # HTN   - BB held with prior orthostasis    Discharge Planning: Possibly home later today, if not tomorrow. Needs HH since declining placement. Diet:  ADULT DIET; Regular  DVT PPx: Stop Lovenox, con't full ASA only.   Code status: Full Code    Hospital Problems             Last Modified POA    * (Principal) Closed right hip fracture, initial encounter ([facility] [address] 2022 Yes    Hypercholesterolemia 2022 Yes    Overview Signed 2022  1:38 AM by German Elizondo MD     Formatting of this note might be different from the original.  HDL goal greater than 40; LDL goal less than 70    Last Assessment & Plan:   Formatting of this note might be different from the original.  HDL was 97, LDL was 18 on 7/15/2022. This is well controlled continue current regimen. Essential hypertension 12/20/2022 Yes    Overview Signed 12/20/2022  1:38 AM by Jesus Shah MD     Last Assessment & Plan:   Formatting of this note might be different from the original.  We encouraged her that her blood pressure readings are good and informed her of the healthy ranges for good blood pressure. Pt asked if her heart rate was too low, we stated that her heart rate was good and decreasing her Norvasc could raise it a little. Today, her blood pressure is 103/67. She was decreased on her Norvasc to 5 mg. This is instead of taking 10 mg a few days a week. I will reevaluate in 4 months            Objective:   Patient Vitals for the past 24 hrs:   Temp Pulse Resp BP SpO2   12/23/22 0814 99.1 °F (37.3 °C) 96 16 108/68 93 %   12/23/22 0324 98.6 °F (37 °C) 89 18 124/85 93 %   12/22/22 2347 98.8 °F (37.1 °C) 87 20 113/77 93 %   12/22/22 2023 98.6 °F (37 °C) 87 18 100/61 94 %   12/22/22 1524 98.8 °F (37.1 °C) 81 17 101/63 96 %   12/22/22 1407 -- -- -- 124/72 --   12/22/22 1138 98.6 °F (37 °C) 75 17 117/73 97 %       Estimated body mass index is 21.97 kg/m² as calculated from the following:    Height as of this encounter: 5' 4\" (1.626 m). Weight as of this encounter: 128 lb (58.1 kg). Intake/Output Summary (Last 24 hours) at 12/23/2022 0833  Last data filed at 12/22/2022 1800  Gross per 24 hour   Intake 720 ml   Output --   Net 720 ml         Physical Exam:   Blood pressure 108/68, pulse 96, temperature 99.1 °F (37.3 °C), temperature source Oral, resp. rate 16, height 5' 4\" (1.626 m), weight 128 lb (58.1 kg), SpO2 93 %. General:    Well nourished. No overt distress. Head:  Normocephalic, atraumatic  Eyes:  Sclerae appear normal. Pupils equally round.   ENT:  Nares appear normal, no drainage. Moist oral mucosa  Neck:  No restricted ROM. Trachea midline. CV:   RRR. No m/r/g. No jugular venous distension. Lungs:   CTAB. No wheezing, rhonchi, or rales. Respirations even, unlabored. Abdomen: Bowel sounds present. Soft, nontender, nondistended. Extremities: No cyanosis or clubbing. No edema. Surgical bandage to R hip, no obvious edema, fluctuance, crepitus or bruising. Skin:     No rashes and normal coloration. Warm and dry. Neuro:  CN II-XII grossly intact. Sensation intact. A&Ox3  Psych:  Normal mood and affect.       I have reviewed ordered lab tests and independently visualized imaging below:    Recent Labs:  Recent Results (from the past 48 hour(s))   Hemoglobin and Hematocrit    Collection Time: 12/22/22  4:29 AM   Result Value Ref Range    Hemoglobin 7.7 (L) 11.7 - 15.4 g/dL    Hematocrit 24.1 (L) 35.8 - 46.3 %   CBC with Auto Differential    Collection Time: 12/23/22  4:38 AM   Result Value Ref Range    WBC 6.6 4.3 - 11.1 K/uL    RBC 2.06 (L) 4.05 - 5.2 M/uL    Hemoglobin 7.1 (L) 11.7 - 15.4 g/dL    Hematocrit 22.3 (L) 35.8 - 46.3 %    .3 (H) 82 - 102 FL    MCH 34.5 (H) 26.1 - 32.9 PG    MCHC 31.8 31.4 - 35.0 g/dL    RDW 13.4 11.9 - 14.6 %    Platelets 803 306 - 607 K/uL    MPV 10.5 9.4 - 12.3 FL    nRBC 0.00 0.0 - 0.2 K/uL    Differential Type AUTOMATED      Seg Neutrophils 62 43 - 78 %    Lymphocytes 24 13 - 44 %    Monocytes 13 (H) 4.0 - 12.0 %    Eosinophils % 1 0.5 - 7.8 %    Basophils 0 0.0 - 2.0 %    Immature Granulocytes 1 0.0 - 5.0 %    Segs Absolute 4.1 1.7 - 8.2 K/UL    Absolute Lymph # 1.6 0.5 - 4.6 K/UL    Absolute Mono # 0.8 0.1 - 1.3 K/UL    Absolute Eos # 0.1 0.0 - 0.8 K/UL    Basophils Absolute 0.0 0.0 - 0.2 K/UL    Absolute Immature Granulocyte 0.0 0.0 - 0.5 K/UL   Basic Metabolic Panel w/ Reflex to MG    Collection Time: 12/23/22  4:38 AM   Result Value Ref Range    Sodium 141 133 - 143 mmol/L    Potassium 4.0 3.5 - 5.1 mmol/L    Chloride 108 101 - 110 mmol/L    CO2 30 21 - 32 mmol/L    Anion Gap 3 2 - 11 mmol/L    Glucose 101 (H) 65 - 100 mg/dL    BUN 11 8 - 23 MG/DL    Creatinine 0.50 (L) 0.6 - 1.0 MG/DL    Est, Glom Filt Rate >60 >60 ml/min/1.73m2    Calcium 8.7 8.3 - 10.4 MG/DL         Other Studies:  XR HIP RIGHT (2-3 VIEWS)    Result Date: 12/21/2022  EXAMINATION: XR HIP RIGHT (2-3 VIEWS) 12/21/2022 9:12 AM ACCESSION NUMBER: [id number] COMPARISON: None available INDICATION: Postop TECHNIQUE: A single view of the pelvis and 2 views of the right hip were obtained. FINDINGS: Postsurgical changes of a right femoral antegrade intramedullary nail and gamma nail transfixing the intertrochanteric fracture. Mild displacement of the lesser trochanter, unchanged. No distal femoral fracture. Soft tissue gas along the right proximal thigh. Mild right hip arthrosis. Status post ORIF transfixing the intertrochanteric right femoral neck fracture.        Current Meds:  Current Facility-Administered Medications   Medication Dose Route Frequency    0.9 % sodium chloride infusion   IntraVENous PRN    sodium chloride flush 0.9 % injection 5-40 mL  5-40 mL IntraVENous 2 times per day    sodium chloride flush 0.9 % injection 5-40 mL  5-40 mL IntraVENous PRN    ondansetron (ZOFRAN-ODT) disintegrating tablet 4 mg  4 mg Oral Q8H PRN    Or    ondansetron (ZOFRAN) injection 4 mg  4 mg IntraVENous Q6H PRN    aspirin EC tablet 325 mg  325 mg Oral Daily    oxyCODONE (ROXICODONE) immediate release tablet 10 mg  10 mg Oral Q4H PRN    HYDROmorphone HCl PF (DILAUDID) injection 0.5 mg  0.5 mg IntraVENous Q2H PRN    melatonin tablet 6 mg  6 mg Oral Nightly PRN    sodium chloride flush 0.9 % injection 5-40 mL  5-40 mL IntraVENous 2 times per day    sodium chloride flush 0.9 % injection 5-40 mL  5-40 mL IntraVENous PRN    0.9 % sodium chloride infusion   IntraVENous PRN    polyethylene glycol (GLYCOLAX) packet 17 g  17 g Oral Daily PRN    acetaminophen (TYLENOL) tablet 650 mg  650 mg Oral Q6H PRN    Or    acetaminophen (TYLENOL) suppository 650 mg  650 mg Rectal Q6H PRN    0.9 % sodium chloride infusion   IntraVENous Continuous    atorvastatin (LIPITOR) tablet 10 mg  10 mg Oral Daily    [Held by provider] metoprolol succinate (TOPROL XL) extended release tablet 50 mg  50 mg Oral Daily    enoxaparin (LOVENOX) injection 40 mg  40 mg SubCUTAneous Q24H       Signed:  Heraclio Myrick MD    Part of this note may have been written by using a voice dictation software. The note has been proof read but may still contain some grammatical/other typographical errors.

## 2022-12-23 NOTE — PROGRESS NOTES
ACUTE PHYSICAL THERAPY GOALS:   (Developed with and agreed upon by patient and/or caregiver.)  (1.) Carmella Burgos  will move from supine to sit and sit to supine , scoot up and down, and roll side to side with MODIFIED INDEPENDENCE within 7 treatment day(s). (2.) Carmella Burgos will transfer from bed to chair and chair to bed with MODIFIED INDEPENDENCE using the least restrictive device within 7 treatment day(s). (3.) Carmella Burgos will ambulate with MODIFIED INDEPENDENCE for 200 feet with the least restrictive device within 7 treatment day(s). (4.) Carmella Burgos will perform standing static and dynamic balance activities x 25 minutes with MODIFIED INDEPENDENCE to improve safety within 7 treatment day(s). (5.) Carmella Burgos will ascend and descend 5 stairs using bilateral hand rail(s) with MODIFIED INDEPENDENCE to improve functional mobility and safety within 7 treatment day(s). (6.) Carmella Burgos will perform therapeutic exercises x 20 min for HEP with INDEPENDENCE to improve strength, endurance, and functional mobility within 7 treatment day(s). PHYSICAL THERAPY: Daily Note AM   (Link to Caseload Tracking: PT Visit Days : 1  Time In/Out PT Charge Capture  Rehab Caseload Tracker  Orders    Carmella Burgos is a 67 y.o. female   PRIMARY DIAGNOSIS: Closed right hip fracture, initial encounter ([facility] [address]  Fall, initial encounter [W19. XXXA]  Closed right hip fracture, initial encounter ([facility] [address] [S72.001A]  Closed intertrochanteric fracture of hip, right, initial encounter ([facility] [address] [S72.141A]  Procedure(s) (LRB):  RIGHT GAMMA NAIL  INSERTION (Right)  2 Days Post-Op  Inpatient: Payor: Moi Medrano / Plan: MEDICARE PART A AND B / Product Type: *No Product type* /     ASSESSMENT:     REHAB RECOMMENDATIONS:   Recommendation to date pending progress:  Setting:  Home Health Therapy    Equipment:    Rolling Walker     ASSESSMENT:  Ms. Katarina Gaffney is a 67year old F who presents s/p R gamma nailing after she sustained a fx after a fall when she got tripped up on her dog. WBAT RLE. The patient is making slow progress toward goals. This morning she is limited by low HBG and did become woozy and symptomatic at times. She required min A only for bed mobility to assist with the RLE. She ambulated a few feet using the RW and CGA with an antalgic gait pattern before taking a seated rest break. She declined further ambulation at this time due to dizziness and fatigue. Participated well with a few seated exercises.        SUBJECTIVE:   Ms. Gemma Enriquez states, \"I do feel woozy now\"     Social/Functional Lives With: Spouse  Type of Home: House  Home Layout: Two level  Home Access: Stairs to enter with rails, Elevator  Entrance Stairs - Number of Steps: 5  Bathroom Shower/Tub: Tub/Shower unit  Bathroom Toilet: Standard  Bathroom Equipment: 3-in-1 commode  Home Equipment: Scancell, standard, [employer]  ADL Assistance: Independent  Homemaking Assistance: Independent  Ambulation Assistance: Independent  Transfer Assistance: Independent  Active : Yes  Mode of Transportation: Car  Occupation: Retired  OBJECTIVE:     PAIN: VITALS / O2: PRECAUTION / Terrie Young / Krystin Wolf:   Pre Treatment: 0/10 at rest, 10/10 with mobility per pt report         Post Treatment: Same as above Vitals        Oxygen    IV    RESTRICTIONS/PRECAUTIONS:  Restrictions/Precautions  Restrictions/Precautions: Fall Risk, Bed Alarm, Weight Bearing  Lower Extremity Weight Bearing Restrictions  Right Lower Extremity Weight Bearing: Weight Bearing As Tolerated  Restrictions/Precautions: Fall Risk, Bed Alarm, Weight Bearing     MOBILITY: I Mod I S SBA CGA Min Mod Max Total  NT x2 Comments:   Bed Mobility    Rolling [] [] [] [] [x] [] [] [] [] [] []    Supine to Sit [] [] [] [] [x] [x] [] [] [] [] []    Scooting [] [] [] [] [x] [] [] [] [] [] []    Sit to Supine [] [] [] [] [] [] [] [] [] [] []    Transfers    Sit to Stand [] [] [] [] [x] [x] [] [] [] [] []  Inc assist from lower surface   Bed to Chair [] [] [] [] [x] [] [] [] [] [] []    Stand to Sit [] [] [] [] [x] [] [] [] [] [] []     [] [] [] [] [] [] [] [] [] [] []    I=Independent, Mod I=Modified Independent, S=Supervision, SBA=Standby Assistance, CGA=Contact Guard Assistance,   Min=Minimal Assistance, Mod=Moderate Assistance, Max=Maximal Assistance, Total=Total Assistance, NT=Not Tested    BALANCE: Good Fair+ Fair Fair- Poor NT Comments   Sitting Static [x] [] [] [] [] []    Sitting Dynamic [x] [] [] [] [] []              Standing Static [] [x] [] [] [] []    Standing Dynamic [] [x] [x] [] [] []      GAIT: I Mod I S SBA CGA Min Mod Max Total  NT x2 Comments:   Level of Assistance [] [] [] [] [x] [x] [] [] [] [] []    Distance 5  feet    DME Gait Belt and Rolling Walker    Gait Quality Antalgic    Weightbearing Status      Stairs      I=Independent, Mod I=Modified Independent, S=Supervision, SBA=Standby Assistance, CGA=Contact Guard Assistance,   Min=Minimal Assistance, Mod=Moderate Assistance, Max=Maximal Assistance, Total=Total Assistance, NT=Not Tested    PLAN:   FREQUENCY AND DURATION: BID for duration of hospital stay or until stated goals are met, whichever comes first.    TREATMENT:   TREATMENT:   Therapeutic Activity (25 Minutes): Therapeutic activity included Supine to Sit, Scooting, Transfer Training, Ambulation on level ground, Sitting balance , Standing balance, and seated exercises to improve functional Activity tolerance, Balance, Coordination, Mobility, Strength, and ROM.     TREATMENT GRID:  N/A    AFTER TREATMENT PRECAUTIONS: Bed/Chair Locked, Call light within reach, Chair, Needs within reach, and RN notified    INTERDISCIPLINARY COLLABORATION:  RN/ PCT and PT/ PTA    EDUCATION:      TIME IN/OUT:  Time In: 0699  Time Out: [facility]  Minutes: [place], PTA

## 2022-12-24 LAB
ABO + RH BLD: NORMAL
BLD PROD TYP BPU: NORMAL
BLOOD BANK DISPENSE STATUS: NORMAL
BLOOD GROUP ANTIBODIES SERPL: NORMAL
BPU ID: NORMAL
CROSSMATCH RESULT: NORMAL
SPECIMEN EXP DATE BLD: NORMAL
UNIT DIVISION: 0

## 2023-01-04 ENCOUNTER — OFFICE VISIT (OUTPATIENT)
Dept: ORTHOPEDIC SURGERY | Age: 73
End: 2023-01-04

## 2023-01-04 DIAGNOSIS — S72.001A CLOSED HIP FRACTURE REQUIRING OPERATIVE REPAIR, RIGHT, INITIAL ENCOUNTER (HCC): Primary | ICD-10-CM

## 2023-01-04 PROCEDURE — 99024 POSTOP FOLLOW-UP VISIT: CPT | Performed by: PHYSICIAN ASSISTANT

## 2023-01-04 NOTE — PROGRESS NOTES
[facility]            Patient ID:  Name: Veto Patterson  AGE/Gender: 67 y.o. female  MRN: [id number]  : 1950    Date of Service: 2023          ALLERGIES:   Allergies   Allergen Reactions    Penicillins Anaphylaxis and Swelling    [place] Anaphylaxis and Hives          History:  The patient is seen today for follow-up. The patient sustained  a right Hip fracture and underwent ORIF at [facility].  They are doing well with regard to the hip,  having very little discomfort or pain. They have no other complaints or concerns: The patient has been progressing with physical therapy. Physical Exam:       General:  On exam the patient is a pleasant 67 y.o. female in no acute distress, A&O x 3. Hip: This incision is healing there is swelling consistent with the postoperative time frame. There is no drainage. ROM not assessed. The calf is soft and non-tender. Assessment and Plan:   The incision is healing. I removed the staples and applied steri strips. OK to bathe. WBAT with walker. The patient was advised to notify us if drainage returns. We will follow up in 4 weeks or sooner if needed.        Electronically signed by:   SOFIA Nguyen, PA  2023,  2:11 PM

## 2023-02-02 ENCOUNTER — OFFICE VISIT (OUTPATIENT)
Dept: ORTHOPEDIC SURGERY | Age: 73
End: 2023-02-02

## 2023-02-02 DIAGNOSIS — S72.001A CLOSED HIP FRACTURE REQUIRING OPERATIVE REPAIR, RIGHT, INITIAL ENCOUNTER (HCC): Primary | ICD-10-CM

## 2023-02-02 PROCEDURE — 99024 POSTOP FOLLOW-UP VISIT: CPT | Performed by: ORTHOPAEDIC SURGERY

## 2023-02-02 NOTE — PROGRESS NOTES
Progress Note    Patient: Alden Waldron MRN: [id number]  SSN: [id number]    YOB: 1950  Age: 67 y.o. Sex: female        2/2/2023  Subjective:      Patient is now about from cephalomedullary nail fixation of a right peritrochanteric proximal femur fracture. She seems that she is doing really well. She says she still has some thigh pain that sort of on some days is a little bit worse than others but in general is getting much better. She seems he pretty happy with her overall progress. She says she can definitely do more today as far as her activity level and she could just a few weeks ago. She does have a home health therapist and she says that she is very happy with them and they seem to be helping quite a bit     Objective:      Vitals   There were no vitals filed for this visit. Physical Exam:      Skin - incision is well healed with no redness or drainage  Motor and sensory function intact in RIGHT LOWER extremity  Pulses palpable in RIGHT LOWER extremity      XRAY FINDINGS:  [id number] right hip intertrochanteric fracture, findings-AP and lateral views of the right hip shows the hardware is intact with no evidence of loosening or failure. The overall alignment is excellent. There does appear to be significant evidence of healing at the primary fracture lines impression-healing well aligned right intertrochanteric proximal femur fracture     Assessment:      Closed displaced right intertrochanteric proximal femur fracture     Plan:      I think at this point she can be full activity with no restrictions she can be full active and passive range of motion of the right hip full strength of the right hip she can be weightbearing as tolerated. She has asked about graduating to a cane.   I just encouraged her that as long as she has any instability or weakness at all it might be wise to continue using a walker just because we do not want her falling again but she did not use any assistive devices at all before this so I do think at some point as she gets her strength back in her confidence there is no reason she cannot try to ambulate with a cane and get rid of the walker. As far as driving is concerned I think that she could drive now when she is comfortable that she can move her lower extremity around without any pain and with very little delay in movement.   I would like to see her back again for another visit in 6 weeks with AP and lateral right hip on return she will be about 3 months out at that time     Signed By: Joseph Stinson MD      February 2, 2023

## 2024-10-14 LAB
CHOLESTEROL, TOTAL: 136 MG/DL
CHOLESTEROL/HDL RATIO: 1.4
HDLC SERPL-MCNC: 95 MG/DL (ref 35–70)
LDL CHOLESTEROL: 19
NONHDLC SERPL-MCNC: 41 MG/DL
TRIGL SERPL-MCNC: 110 MG/DL
VLDLC SERPL CALC-MCNC: 22 MG/DL

## 2024-11-18 ENCOUNTER — OFFICE VISIT (OUTPATIENT)
Dept: PRIMARY CARE CLINIC | Facility: CLINIC | Age: 74
End: 2024-11-18

## 2024-11-18 VITALS
DIASTOLIC BLOOD PRESSURE: 70 MMHG | HEIGHT: 64 IN | TEMPERATURE: 97.6 F | SYSTOLIC BLOOD PRESSURE: 110 MMHG | HEART RATE: 56 BPM | OXYGEN SATURATION: 95 % | WEIGHT: 128 LBS | BODY MASS INDEX: 21.85 KG/M2 | RESPIRATION RATE: 16 BRPM

## 2024-11-18 DIAGNOSIS — Z12.31 ENCOUNTER FOR SCREENING MAMMOGRAM FOR MALIGNANT NEOPLASM OF BREAST: ICD-10-CM

## 2024-11-18 DIAGNOSIS — Z13.1 SCREENING FOR DIABETES MELLITUS: ICD-10-CM

## 2024-11-18 DIAGNOSIS — E83.110 HEMOCHROMATOSIS ASSOCIATED WITH MUTATION IN HFE GENE (HCC): ICD-10-CM

## 2024-11-18 DIAGNOSIS — Z86.79 HISTORY OF AORTIC DISSECTION: ICD-10-CM

## 2024-11-18 DIAGNOSIS — E78.00 HYPERCHOLESTEROLEMIA: ICD-10-CM

## 2024-11-18 DIAGNOSIS — Z76.89 ENCOUNTER TO ESTABLISH CARE: Primary | ICD-10-CM

## 2024-11-18 DIAGNOSIS — R91.8 PULMONARY MASS: ICD-10-CM

## 2024-11-18 DIAGNOSIS — I10 ESSENTIAL HYPERTENSION: ICD-10-CM

## 2024-11-18 DIAGNOSIS — K86.2 CYST OF PANCREAS: ICD-10-CM

## 2024-11-18 PROBLEM — R10.10 UPPER ABDOMINAL PAIN: Status: RESOLVED | Noted: 2021-04-19 | Resolved: 2024-11-18

## 2024-11-18 PROBLEM — M81.0 AGE-RELATED OSTEOPOROSIS WITHOUT CURRENT PATHOLOGICAL FRACTURE: Status: ACTIVE | Noted: 2017-10-12

## 2024-11-18 PROBLEM — D49.0 IPMN (INTRADUCTAL PAPILLARY MUCINOUS NEOPLASM): Status: ACTIVE | Noted: 2024-09-13

## 2024-11-18 PROBLEM — R11.2 NAUSEA, VOMITING, AND DIARRHEA: Status: RESOLVED | Noted: 2021-04-19 | Resolved: 2024-11-18

## 2024-11-18 PROBLEM — Z14.8 CARRIER OF HEMOCHROMATOSIS HFE GENE MUTATION: Status: ACTIVE | Noted: 2023-10-13

## 2024-11-18 PROBLEM — R19.7 NAUSEA, VOMITING, AND DIARRHEA: Status: RESOLVED | Noted: 2021-04-19 | Resolved: 2024-11-18

## 2024-11-18 PROBLEM — E83.19 HIGH IRON CONTENT OF LIVER DETERMINED BY MAGNETIC RESONANCE IMAGING: Status: ACTIVE | Noted: 2023-10-13

## 2024-11-18 PROBLEM — K21.9 GASTROESOPHAGEAL REFLUX DISEASE: Status: ACTIVE | Noted: 2021-04-19

## 2024-11-18 PROBLEM — F51.01 PRIMARY INSOMNIA: Status: ACTIVE | Noted: 2017-04-11

## 2024-11-18 PROBLEM — Z71.0 PERSON ENCOUNTERING HEALTH SERVICES TO CONSULT ON BEHALF OF ANOTHER PERSON: Status: ACTIVE | Noted: 2024-11-18

## 2024-11-18 PROBLEM — R11.2 NAUSEA VOMITING AND DIARRHEA: Status: ACTIVE | Noted: 2021-04-19

## 2024-11-18 PROBLEM — K31.7 HYPERPLASTIC POLYPS OF STOMACH: Status: ACTIVE | Noted: 2021-06-28

## 2024-11-18 PROBLEM — R19.7 NAUSEA VOMITING AND DIARRHEA: Status: ACTIVE | Noted: 2021-04-19

## 2024-11-18 PROBLEM — D75.89 MACROCYTOSIS: Status: RESOLVED | Noted: 2021-06-28 | Resolved: 2024-11-18

## 2024-11-18 PROBLEM — A31.0 MYCOBACTERIUM AVIUM INFECTION (HCC): Status: RESOLVED | Noted: 2017-11-07 | Resolved: 2024-11-18

## 2024-11-18 PROBLEM — K76.0 FATTY LIVER: Status: ACTIVE | Noted: 2024-09-13

## 2024-11-18 PROBLEM — E55.9 VITAMIN D DEFICIENCY: Status: ACTIVE | Noted: 2023-10-13

## 2024-11-18 RX ORDER — AMLODIPINE BESYLATE 5 MG/1
5 TABLET ORAL DAILY
COMMUNITY
Start: 2024-10-16

## 2024-11-18 SDOH — ECONOMIC STABILITY: FOOD INSECURITY: WITHIN THE PAST 12 MONTHS, THE FOOD YOU BOUGHT JUST DIDN'T LAST AND YOU DIDN'T HAVE MONEY TO GET MORE.: NEVER TRUE

## 2024-11-18 SDOH — ECONOMIC STABILITY: INCOME INSECURITY: HOW HARD IS IT FOR YOU TO PAY FOR THE VERY BASICS LIKE FOOD, HOUSING, MEDICAL CARE, AND HEATING?: NOT HARD AT ALL

## 2024-11-18 SDOH — ECONOMIC STABILITY: FOOD INSECURITY: WITHIN THE PAST 12 MONTHS, YOU WORRIED THAT YOUR FOOD WOULD RUN OUT BEFORE YOU GOT MONEY TO BUY MORE.: NEVER TRUE

## 2024-11-18 ASSESSMENT — ENCOUNTER SYMPTOMS
DIARRHEA: 0
COUGH: 0
CHEST TIGHTNESS: 0
VOMITING: 0
BLOOD IN STOOL: 0
SHORTNESS OF BREATH: 0
NAUSEA: 0

## 2024-11-18 ASSESSMENT — PATIENT HEALTH QUESTIONNAIRE - PHQ9
SUM OF ALL RESPONSES TO PHQ QUESTIONS 1-9: 0
1. LITTLE INTEREST OR PLEASURE IN DOING THINGS: NOT AT ALL
SUM OF ALL RESPONSES TO PHQ QUESTIONS 1-9: 0
SUM OF ALL RESPONSES TO PHQ9 QUESTIONS 1 & 2: 0
2. FEELING DOWN, DEPRESSED OR HOPELESS: NOT AT ALL

## 2024-11-18 NOTE — PROGRESS NOTES
[facility]  Joceline \"Nora\" RENA Sal  [address], [place] [address]  [phone]          ASSESSMENT AND PLAN    Problem List Items Addressed This Visit       Hypercholesterolemia (Chronic)    Relevant Medications    amLODIPine (NORVASC) 5 MG tablet    Other Relevant Orders    [facility]    Essential hypertension    Relevant Medications    amLODIPine (NORVASC) 5 MG tablet    Other Relevant Orders    [facility]    Cyst of pancreas    Relevant Orders    [facility]    Pulmonary mass    Relevant Orders    Ambulatory referral to Pulmonology    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 Hematology & Oncology     Other Visit Diagnoses       Encounter to establish care    -  Primary    Relevant Orders    [facility]    Encounter for screening mammogram for malignant neoplasm of breast        Relevant Orders    [facility] [place] DIGITAL SCREEN BILATERAL    Ambulatory referral to Pulmonology    History of aortic dissection        Relevant Orders    [facility]    Screening for diabetes mellitus        Relevant Orders    Hemoglobin A1C             The diagnoses and plan were discussed with the patient, who verbalizes understanding and agrees with plan.  All questions answered.    Chief Complaint    Chief Complaint   Patient presents with    New Patient         HISTORY OF PRESENT ILLNESS    Rosalee Dickinson is a 74 y.o. 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

## 2024-11-19 NOTE — PATIENT INSTRUCTIONS
IT WAS GREAT TO MEET YOU YESTERDAY!    PLEASE LET US KNOW, IF YOU CHANGE YOUR MIND ABOUT TREATMENT FOR THE WOUND ON YOUR LEG OR IF SYMPTOMS WORSEN- AS OF YESTERDAY, IT APPEARED TO BE HEALING WELL WITH YOUR SELF-CARE. *PLEASE LET US KNOW WHEN YOUR LAST TDAP (TETANUS, DIPHTERIA, & PERTUSSIS BOOSTER WAS)- IF IT WAS NOT IN THE LAST 10 YEARS, WE WOULD REALLY LIKE TO ADMINISTER, ESPECIALLY WITH THIS KIND OF INJURY.     PLEASE TAKE ALL MEDICATION AS DISCUSSED.     I FINALLY FOUND YOUR LAST DEXA SCAN FOR OSTEOPOROSIS- IT WAS DONE LAST OCTOBER. I'D RECOMMEND WE DO SOMETIME NEXT FALL. REMEMBER, THIS CONDITION INCREASES YOUR RISK OF BREAKING BONES WITH MINIMAL TO NO TRAUMA, SO IF YOU EVER STEP AND FOOT STARTS HURTING OR SIMILAR INJURY NOT IN PROPORTION TO MECHANISM, PLEASE LET US KNOW SO WE CAN DO AN X RAY AND MAKE SURE NOTHING'S BROKEN.    DRINK LOTS OF WATER. WEAR YOUR SEATBELT. KEEP UP THE GREAT WORK WITH YOUR ROWING & STATIONARY BIKING, AS WELL AS HEALTHY HOMECOOKED MEALS.    PLEASE SWITCH TO A NO-IRON MULTIVITAMIN, IF YOURS CONTAINS IRON. THERE IS A WEBSITE CALLED [email or web address] THAT HAD A GOOD-LOOKING ONE.     REFERRALS HAVE BEEN SENT TO [facility] PULMONOLOGY, GASTROENTEROLOGY, [facility] CARDIOLOGY (WITH REQUEST FOR DR. REICH), HEMATOLOGY & ONCOLOGY, VASCULAR SURGERY, & YOUR MAMMOGRAM WAS ORDERED. IF YOU DO NOT HEAR FROM THESE REFERRALS IN THE NEXT 2 WEEKS, PLEASE LET US KNOW. RADIOLOGY SHOULD CALL TO SCHEDULE YOUR MAMMOGRAM SOON.    WE WILL SEE YOU AGAIN AT YOUR NEXT APPOINTMENT WITH DR. CROWLEY 12/19/2024 (HEMOGLOBIN A1C ORDERED FOR THAT VISIT- HOWEVER, YOU DO NOT HAVE TO FAST FOR THIS LAB) BUT PLEASE SEND [place] MESSAGE OR CALL WITH CONCERNS -[phone]     HAPPY THANKSGIVING TO YOUR WHOLE FAMILY :)

## 2024-11-29 DIAGNOSIS — E83.119 HEMOCHROMATOSIS, UNSPECIFIED HEMOCHROMATOSIS TYPE: ICD-10-CM

## 2024-11-29 DIAGNOSIS — C50.919 MALIGNANT NEOPLASM OF FEMALE BREAST, UNSPECIFIED ESTROGEN RECEPTOR STATUS, UNSPECIFIED LATERALITY, UNSPECIFIED SITE OF BREAST (HCC): Primary | ICD-10-CM

## 2024-12-02 NOTE — PROGRESS NOTES
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 Stable 1.3 cm side branch IPMN dorsally along the pancreatic body without suspicious features.  2. New, diffuse hepatic iron deposition. Previous abdominal MRI demonstrated diffuse hepatic steatosis.    8/8/23 - MRI FERRISCAN - IRON OVERLOAD LIVER ([facility])  Impression  Elevated liver iron concentration, as above    9/27/23 - Hereditary Hemochromatosis (CE)  Comment Result:  c.845G>A (p.[id number]) - Detected, heterozygous  c.187C>G (p.[id number]) - Not Detected  c.193A>T (p.[id number]) - Not Detected  Not associated with increased risk to develop clinical symptoms of Hereditary Hemochromatosis. In symptomatic individuals, other causes of iron overload should be evaluated.  See Additional Information and Comments.     11/17/23 - Liver biopsy (CE)  Final Diagnosis  A.  Liver, biopsy:  Liver tissue with increased siderosis within hepatocytes and Kupffer cells (grade 3 out of 4).  See comment.  B.  Liver, biopsy:  Tissue sent out for special testing, no microscopic evaluation performed.     Microscopic Description   Plasma

## 2024-12-03 ENCOUNTER — HOSPITAL ENCOUNTER (OUTPATIENT)
Dept: LAB | Age: 74
Discharge: HOME | End: 2024-12-03
Payer: MEDICARE

## 2024-12-03 ENCOUNTER — OFFICE VISIT (OUTPATIENT)
Dept: ONCOLOGY | Age: 74
End: 2024-12-03
Payer: MEDICARE

## 2024-12-03 VITALS
BODY MASS INDEX: 21.95 KG/M2 | OXYGEN SATURATION: 95 % | SYSTOLIC BLOOD PRESSURE: 131 MMHG | WEIGHT: 128.6 LBS | HEIGHT: 64 IN | DIASTOLIC BLOOD PRESSURE: 89 MMHG | HEART RATE: 50 BPM | TEMPERATURE: 97.7 F | RESPIRATION RATE: 18 BRPM

## 2024-12-03 DIAGNOSIS — E83.110 HEMOCHROMATOSIS ASSOCIATED WITH MUTATION IN HFE GENE (HCC): Primary | ICD-10-CM

## 2024-12-03 DIAGNOSIS — D49.0 IPMN (INTRADUCTAL PAPILLARY MUCINOUS NEOPLASM): ICD-10-CM

## 2024-12-03 DIAGNOSIS — E83.119 HEMOCHROMATOSIS, UNSPECIFIED HEMOCHROMATOSIS TYPE: ICD-10-CM

## 2024-12-03 LAB
25(OH)D3 SERPL-MCNC: 102 NG/ML (ref 30–100)
ALBUMIN SERPL-MCNC: 4 G/DL (ref 3.2–4.6)
ALBUMIN/GLOB SERPL: 1 (ref 1–1.9)
ALP SERPL-CCNC: 114 U/L (ref 35–104)
ALT SERPL-CCNC: 52 U/L (ref 8–45)
ANION GAP SERPL CALC-SCNC: 12 MMOL/L (ref 7–16)
AST SERPL-CCNC: 92 U/L (ref 15–37)
BASOPHILS # BLD: 0.1 K/UL (ref 0–0.2)
BASOPHILS NFR BLD: 1 % (ref 0–2)
BILIRUB SERPL-MCNC: 0.8 MG/DL (ref 0–1.2)
BUN SERPL-MCNC: 16 MG/DL (ref 8–23)
CALCIUM SERPL-MCNC: 9.9 MG/DL (ref 8.8–10.2)
CHLORIDE SERPL-SCNC: 102 MMOL/L (ref 98–107)
CO2 SERPL-SCNC: 25 MMOL/L (ref 20–29)
CREAT SERPL-MCNC: 0.7 MG/DL (ref 0.6–1.1)
DIFFERENTIAL METHOD BLD: ABNORMAL
EOSINOPHIL # BLD: 0.2 K/UL (ref 0–0.8)
EOSINOPHIL NFR BLD: 3 % (ref 0.5–7.8)
ERYTHROCYTE [DISTWIDTH] IN BLOOD BY AUTOMATED COUNT: 15.3 % (ref 11.9–14.6)
FERRITIN SERPL-MCNC: 439 NG/ML (ref 8–388)
GLOBULIN SER CALC-MCNC: 4 G/DL (ref 2.3–3.5)
GLUCOSE SERPL-MCNC: 85 MG/DL (ref 70–99)
HCT VFR BLD AUTO: 39 % (ref 35.8–46.3)
HGB BLD-MCNC: 12.8 G/DL (ref 11.7–15.4)
IMM GRANULOCYTES # BLD AUTO: 0 K/UL (ref 0–0.5)
IMM GRANULOCYTES NFR BLD AUTO: 0 % (ref 0–5)
IRON SATN MFR SERPL: 27 % (ref 20–50)
IRON SERPL-MCNC: 82 UG/DL (ref 35–100)
LYMPHOCYTES # BLD: 1.6 K/UL (ref 0.5–4.6)
LYMPHOCYTES NFR BLD: 23 % (ref 13–44)
MAGNESIUM SERPL-MCNC: 1.9 MG/DL (ref 1.8–2.4)
MCH RBC QN AUTO: 36.8 PG (ref 26.1–32.9)
MCHC RBC AUTO-ENTMCNC: 32.8 G/DL (ref 31.4–35)
MCV RBC AUTO: 112.1 FL (ref 82–102)
MONOCYTES # BLD: 0.7 K/UL (ref 0.1–1.3)
MONOCYTES NFR BLD: 10 % (ref 4–12)
NEUTS SEG # BLD: 4.3 K/UL (ref 1.7–8.2)
NEUTS SEG NFR BLD: 63 % (ref 43–78)
NRBC # BLD: 0 K/UL (ref 0–0.2)
PLATELET # BLD AUTO: 307 K/UL (ref 150–450)
PMV BLD AUTO: 9.4 FL (ref 9.4–12.3)
POTASSIUM SERPL-SCNC: 4.7 MMOL/L (ref 3.5–5.1)
PROT SERPL-MCNC: 7.9 G/DL (ref 6.3–8.2)
RBC # BLD AUTO: 3.48 M/UL (ref 4.05–5.2)
SODIUM SERPL-SCNC: 139 MMOL/L (ref 136–145)
TIBC SERPL-MCNC: 299 UG/DL (ref 240–450)
UIBC SERPL-MCNC: 217 UG/DL (ref 112–347)
VIT B12 SERPL-MCNC: 1195 PG/ML (ref 193–986)
WBC # BLD AUTO: 6.8 K/UL (ref 4.3–11.1)

## 2024-12-03 PROCEDURE — 82306 VITAMIN D 25 HYDROXY: CPT

## 2024-12-03 PROCEDURE — 3017F COLORECTAL CA SCREEN DOC REV: CPT | Performed by: INTERNAL MEDICINE

## 2024-12-03 PROCEDURE — 1036F TOBACCO NON-USER: CPT | Performed by: INTERNAL MEDICINE

## 2024-12-03 PROCEDURE — 82607 VITAMIN B-12: CPT

## 2024-12-03 PROCEDURE — 80053 COMPREHEN METABOLIC PANEL: CPT

## 2024-12-03 PROCEDURE — 1159F MED LIST DOCD IN RCRD: CPT | Performed by: INTERNAL MEDICINE

## 2024-12-03 PROCEDURE — 1126F AMNT PAIN NOTED NONE PRSNT: CPT | Performed by: INTERNAL MEDICINE

## 2024-12-03 PROCEDURE — 36415 COLL VENOUS BLD VENIPUNCTURE: CPT

## 2024-12-03 PROCEDURE — G8420 CALC BMI NORM PARAMETERS: HCPCS | Performed by: INTERNAL MEDICINE

## 2024-12-03 PROCEDURE — G8427 DOCREV CUR MEDS BY ELIG CLIN: HCPCS | Performed by: INTERNAL MEDICINE

## 2024-12-03 PROCEDURE — 1090F PRES/ABSN URINE INCON ASSESS: CPT | Performed by: INTERNAL MEDICINE

## 2024-12-03 PROCEDURE — G8482 FLU IMMUNIZE ORDER/ADMIN: HCPCS | Performed by: INTERNAL MEDICINE

## 2024-12-03 PROCEDURE — 1160F RVW MEDS BY RX/DR IN RCRD: CPT | Performed by: INTERNAL MEDICINE

## 2024-12-03 PROCEDURE — G8400 PT W/DXA NO RESULTS DOC: HCPCS | Performed by: INTERNAL MEDICINE

## 2024-12-03 PROCEDURE — 83540 ASSAY OF IRON: CPT

## 2024-12-03 PROCEDURE — 1123F ACP DISCUSS/DSCN MKR DOCD: CPT | Performed by: INTERNAL MEDICINE

## 2024-12-03 PROCEDURE — 85025 COMPLETE CBC W/AUTO DIFF WBC: CPT

## 2024-12-03 PROCEDURE — 83550 IRON BINDING TEST: CPT

## 2024-12-03 PROCEDURE — 82728 ASSAY OF FERRITIN: CPT

## 2024-12-03 PROCEDURE — 3079F DIAST BP 80-89 MM HG: CPT | Performed by: INTERNAL MEDICINE

## 2024-12-03 PROCEDURE — 99204 OFFICE O/P NEW MOD 45 MIN: CPT | Performed by: INTERNAL MEDICINE

## 2024-12-03 PROCEDURE — 3075F SYST BP GE 130 - 139MM HG: CPT | Performed by: INTERNAL MEDICINE

## 2024-12-03 PROCEDURE — 83735 ASSAY OF MAGNESIUM: CPT

## 2024-12-03 RX ORDER — MULTIVITAMIN,THERAPEUTIC
1 TABLET ORAL DAILY
COMMUNITY

## 2024-12-03 RX ORDER — FLUTICASONE FUROATE, UMECLIDINIUM BROMIDE AND VILANTEROL TRIFENATATE 100; 62.5; 25 UG/1; UG/1; UG/1
1 POWDER RESPIRATORY (INHALATION) DAILY
COMMUNITY
Start: 2024-10-24

## 2024-12-03 RX ORDER — SODIUM CHLORIDE FOR INHALATION 3 %
4 VIAL, NEBULIZER (ML) INHALATION PRN
COMMUNITY
Start: 2024-10-18

## 2024-12-03 ASSESSMENT — PATIENT HEALTH QUESTIONNAIRE - PHQ9
1. LITTLE INTEREST OR PLEASURE IN DOING THINGS: NOT AT ALL
SUM OF ALL RESPONSES TO PHQ QUESTIONS 1-9: 0
SUM OF ALL RESPONSES TO PHQ QUESTIONS 1-9: 0
2. FEELING DOWN, DEPRESSED OR HOPELESS: NOT AT ALL
SUM OF ALL RESPONSES TO PHQ QUESTIONS 1-9: 0
SUM OF ALL RESPONSES TO PHQ QUESTIONS 1-9: 0
SUM OF ALL RESPONSES TO PHQ9 QUESTIONS 1 & 2: 0

## 2024-12-03 NOTE — PROGRESS NOTES
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 Stable 1.3 cm side branch IPMN dorsally along the pancreatic body without suspicious features.  2. New, diffuse hepatic iron deposition. Previous abdominal MRI demonstrated diffuse hepatic steatosis.     8/8/23 - MRI FERRISCAN - IRON OVERLOAD LIVER (CE)  Impression  Elevated liver iron concentration, as above     9/27/23 - Hereditary Hemochromatosis (CE)  Comment Result:  c.845G>A (p.Lys917Mmx) - Detected, heterozygous  c.187C>G (p.Wya71Bkk) - Not Detected  c.193A>T (p.Cvk52Qqc) - Not Detected  Not associated with increased risk to develop clinical symptoms of Hereditary Hemochromatosis. In symptomatic individuals, other causes of iron overload should be evaluated.  See Additional Information and Comments.      11/17/23 - Liver biopsy (CE)  Final Diagnosis  A.  Liver, biopsy:  Liver tissue with increased siderosis within hepatocytes and Kupffer cells (grade 3 out of 4).  See comment.  B.  Liver, biopsy:  Tissue sent out for special

## 2024-12-03 NOTE — PROGRESS NOTES
[facility]: [facility]   Date Prescription Written: 12/03/2024   Gender: Female   Patient Race:  /    Patient First Name: stephy   Patient Last Name: jailyn   Patient YOB: 1950   Patient Address I[address]   Patient City: [place]   State: [place]   ZipCode: [address]   Patient Phone Number: [phone]   Primary Diagnosis: Hemochromatosis/Excessive Iron   List any major illness within the past year: excessive iron   List current Medications: metoprolol  vitamins (multi)  amlodipine  lipitor  pepcid   Phlebotomy Frequency: Q2 Months   Prescribed Hgb Level: 12.5   Select Prescription Expiration: One year (must be renewed annually)   Physician Printed Name*: dr layla burger   Physician License: [id number]   Physician Office Name: [facility]   Provider NPI: [id number]   Physician Office Telephone: [phone]   Physician Office Email: [email or web address]   Physician Office Address I: [address]   Physician Office City: [place]   Select a State: [place]   ZipCode: [address]   Contact Nurse Name: joseph gonzalez   Contact Nurse Phone: [phone]   Submitted On: 2024-12-03 16:21:32   IP Address: [phone]   Thank you!     Your form was successfully submitted. We received the information shown above.

## 2024-12-03 NOTE — PATIENT INSTRUCTIONS
Patient Information from Today's Visit    The members of your [facility] are listed below:    Physician Provider: Tamara [facility]ias Medical Oncologist  Advanced Practice Clinician: Erendira Orantes NP  Registered Nurse: Regine SMITH   Navigator:   Medical Assistant: Mercy MASON MA  : Kerrie CHAMBERS   Supportive Care Services: Denia JOHNSON LMSW    Diagnosis: hemochromatosis       Follow Up Instructions:   Reviewed labs  Universities available that specializes in hepatic disorders  Iron is high-do 1x phlebotomy  If hemoglobin lower 12-no blood draw (become anemic)  Need therapeutic blood draws every other month  Blood connection [phone]   Treatment Summary has been discussed and given to patient:      Current Labs:   Hospital Outpatient Visit on 12/03/2024   Component Date Value Ref Range Status    WBC 12/03/2024 6.8  4.3 - 11.1 K/uL Final    RBC 12/03/2024 3.48 (L)  4.05 - 5.2 M/uL Final    Hemoglobin 12/03/2024 12.8  11.7 - 15.4 g/dL Final    Hematocrit 12/03/2024 39.0  35.8 - 46.3 % Final    MCV 12/03/2024 112.1 (H)  82.0 - 102.0 FL Final    MCH 12/03/2024 36.8 (H)  26.1 - 32.9 PG Final    MCHC 12/03/2024 32.8  31.4 - 35.0 g/dL Final    RDW 12/03/2024 15.3 (H)  11.9 - 14.6 % Final    Platelets 12/03/2024 307  150 - 450 K/uL Final    MPV 12/03/2024 9.4  9.4 - 12.3 FL Final    nRBC 12/03/2024 0.00  0.0 - 0.2 K/uL Final    **Note: Absolute NRBC parameter is now reported with Hemogram**    Neutrophils % 12/03/2024 63  43 - 78 % Final    Lymphocytes % 12/03/2024 23  13 - 44 % Final    Monocytes % 12/03/2024 10  4.0 - 12.0 % Final    Eosinophils % 12/03/2024 3  0.5 - 7.8 % Final    Basophils % 12/03/2024 1  0.0 - 2.0 % Final    Immature Granulocytes % 12/03/2024 0  0.0 - 5.0 % Final    Neutrophils Absolute 12/03/2024 4.3  1.7 - 8.2 K/UL Final    Lymphocytes Absolute 12/03/2024 1.6  0.5 - 4.6 K/UL Final    Monocytes Absolute 12/03/2024 0.7  0.1 - 1.3 K/UL Final    Eosinophils Absolute 12/03/2024 0.2  0.0 -

## 2024-12-10 ENCOUNTER — OFFICE VISIT (OUTPATIENT)
Age: 74
End: 2024-12-10
Payer: MEDICARE

## 2024-12-10 VITALS
DIASTOLIC BLOOD PRESSURE: 71 MMHG | WEIGHT: 130 LBS | SYSTOLIC BLOOD PRESSURE: 119 MMHG | RESPIRATION RATE: 16 BRPM | OXYGEN SATURATION: 99 % | HEART RATE: 48 BPM | BODY MASS INDEX: 22.31 KG/M2

## 2024-12-10 DIAGNOSIS — E83.118 OTHER HEMOCHROMATOSIS: ICD-10-CM

## 2024-12-10 DIAGNOSIS — K86.2 PANCREATIC CYST: Primary | ICD-10-CM

## 2024-12-10 PROCEDURE — 99204 OFFICE O/P NEW MOD 45 MIN: CPT | Performed by: INTERNAL MEDICINE

## 2024-12-10 PROCEDURE — 3078F DIAST BP <80 MM HG: CPT | Performed by: INTERNAL MEDICINE

## 2024-12-10 PROCEDURE — G8420 CALC BMI NORM PARAMETERS: HCPCS | Performed by: INTERNAL MEDICINE

## 2024-12-10 PROCEDURE — 1090F PRES/ABSN URINE INCON ASSESS: CPT | Performed by: INTERNAL MEDICINE

## 2024-12-10 PROCEDURE — 1036F TOBACCO NON-USER: CPT | Performed by: INTERNAL MEDICINE

## 2024-12-10 PROCEDURE — G8427 DOCREV CUR MEDS BY ELIG CLIN: HCPCS | Performed by: INTERNAL MEDICINE

## 2024-12-10 PROCEDURE — G8400 PT W/DXA NO RESULTS DOC: HCPCS | Performed by: INTERNAL MEDICINE

## 2024-12-10 PROCEDURE — G8482 FLU IMMUNIZE ORDER/ADMIN: HCPCS | Performed by: INTERNAL MEDICINE

## 2024-12-10 PROCEDURE — 3074F SYST BP LT 130 MM HG: CPT | Performed by: INTERNAL MEDICINE

## 2024-12-10 PROCEDURE — 1123F ACP DISCUSS/DSCN MKR DOCD: CPT | Performed by: INTERNAL MEDICINE

## 2024-12-10 PROCEDURE — 3017F COLORECTAL CA SCREEN DOC REV: CPT | Performed by: INTERNAL MEDICINE

## 2024-12-10 RX ORDER — ASPIRIN 81 MG/1
81 TABLET ORAL DAILY
COMMUNITY

## 2024-12-10 NOTE — PROGRESS NOTES
GASTROENTEROLOGY CLINIC VISIT    CC: Pancreatic cysts     HPI:   Rosalee Dickinson is 74 y.o. y/o female  to establish care. She denies specific GI complaints today. We reviewed her GI history including side branch IPMN that has been monitored with MRI and EUS. Hemochromatosis, following with hematology and receiving serial phlebotomy's.         FMH:   Denies FMH gastric or colorectal cancer   Denies FMH autoimmune disease     SocHx:   Smoking - no   Alcohol use - rarely       PE:   Vitals:    12/10/24 1036   BP: 119/71   Pulse: (!) 48   Resp: 16   SpO2: 99%      General:  The patient appears well-nourished, and is in no acute distress.    Respiratory: Respiratory effort is normal.   Cardiovascular:  Regular rate and rhythm.     Abdomen:  Soft, non tender to palpation. No distention.   Neurologic:  Alert and oriented x3.        Labs:  Lab Results   Component Value Date    HGB 12.8 12/03/2024    WBC 6.8 12/03/2024     12/03/2024    .1 (H) 12/03/2024    IRON 82 12/03/2024    FERRITIN 439 (H) 12/03/2024    TIBC 299 12/03/2024    CREATININE 0.70 12/03/2024    ALT 52 (H) 12/03/2024    AST 92 (H) 12/03/2024     Liver biopsy 11/17/2023     A.  Liver, biopsy:  Liver tissue with increased siderosis within hepatocytes and Kupffer cells (grade 3 out of 4).  See comment.  B.  Liver, biopsy:  Tissue sent out for special testing, no microscopic evaluation performed.     Microscopic Description            Plasma cells: absent  Neutrophilic: absent  Steatosis: No significant steatosis (<5% of liver involved)     Elle's hyaline is: absent  Glycogenated nuclei: absent  Iron is moderate, grade 3, involving kupffer cells and involving hepatocytes, iron stain   Periodic acid gale stain with and without diastase (D-PAS) reveals no positive Kupffer cells. Intracytoplasmic hepatocellular (D-PAS) globules are absent.  Cholestasis: absent  Granulomas: absent    Imaging:   MRI abdomen pelvis 3/26/2024  Liver:  Evidence of

## 2024-12-16 ENCOUNTER — OFFICE VISIT (OUTPATIENT)
Dept: VASCULAR SURGERY | Age: 74
End: 2024-12-16
Payer: MEDICARE

## 2024-12-16 VITALS
HEIGHT: 64 IN | HEART RATE: 67 BPM | SYSTOLIC BLOOD PRESSURE: 136 MMHG | DIASTOLIC BLOOD PRESSURE: 99 MMHG | BODY MASS INDEX: 23.44 KG/M2 | WEIGHT: 137.3 LBS | OXYGEN SATURATION: 94 %

## 2024-12-16 DIAGNOSIS — I71.012 DISSECTING ANEURYSM OF THORACIC AORTA, STANFORD TYPE B (HCC): Primary | ICD-10-CM

## 2024-12-16 PROCEDURE — 3075F SYST BP GE 130 - 139MM HG: CPT | Performed by: STUDENT IN AN ORGANIZED HEALTH CARE EDUCATION/TRAINING PROGRAM

## 2024-12-16 PROCEDURE — G8482 FLU IMMUNIZE ORDER/ADMIN: HCPCS | Performed by: STUDENT IN AN ORGANIZED HEALTH CARE EDUCATION/TRAINING PROGRAM

## 2024-12-16 PROCEDURE — G8400 PT W/DXA NO RESULTS DOC: HCPCS | Performed by: STUDENT IN AN ORGANIZED HEALTH CARE EDUCATION/TRAINING PROGRAM

## 2024-12-16 PROCEDURE — G8420 CALC BMI NORM PARAMETERS: HCPCS | Performed by: STUDENT IN AN ORGANIZED HEALTH CARE EDUCATION/TRAINING PROGRAM

## 2024-12-16 PROCEDURE — 1036F TOBACCO NON-USER: CPT | Performed by: STUDENT IN AN ORGANIZED HEALTH CARE EDUCATION/TRAINING PROGRAM

## 2024-12-16 PROCEDURE — 3017F COLORECTAL CA SCREEN DOC REV: CPT | Performed by: STUDENT IN AN ORGANIZED HEALTH CARE EDUCATION/TRAINING PROGRAM

## 2024-12-16 PROCEDURE — 1090F PRES/ABSN URINE INCON ASSESS: CPT | Performed by: STUDENT IN AN ORGANIZED HEALTH CARE EDUCATION/TRAINING PROGRAM

## 2024-12-16 PROCEDURE — 99204 OFFICE O/P NEW MOD 45 MIN: CPT | Performed by: STUDENT IN AN ORGANIZED HEALTH CARE EDUCATION/TRAINING PROGRAM

## 2024-12-16 PROCEDURE — 3079F DIAST BP 80-89 MM HG: CPT | Performed by: STUDENT IN AN ORGANIZED HEALTH CARE EDUCATION/TRAINING PROGRAM

## 2024-12-16 PROCEDURE — G8427 DOCREV CUR MEDS BY ELIG CLIN: HCPCS | Performed by: STUDENT IN AN ORGANIZED HEALTH CARE EDUCATION/TRAINING PROGRAM

## 2024-12-16 PROCEDURE — 1123F ACP DISCUSS/DSCN MKR DOCD: CPT | Performed by: STUDENT IN AN ORGANIZED HEALTH CARE EDUCATION/TRAINING PROGRAM

## 2024-12-16 PROCEDURE — 1159F MED LIST DOCD IN RCRD: CPT | Performed by: STUDENT IN AN ORGANIZED HEALTH CARE EDUCATION/TRAINING PROGRAM

## 2024-12-16 NOTE — PROGRESS NOTES
(VITAMIN D3) 50 MCG (2000 UT) CAPS Take 3 capsules by mouth       No current facility-administered medications for this visit.       Past Surgical History:   Procedure Laterality Date    COLONOSCOPY      2023 per pt    EGD      2023 per pt    FEMUR FRACTURE SURGERY Right 12/21/2022    RIGHT GAMMA NAIL  INSERTION performed by Corky Lyons MD at [facility] MAIN OR    OTHER SURGICAL HISTORY      hip repair   12/2022       Metal implants or AICD: no    Dye allergy: no     Smoker:  Tobacco Use      Smoking status: Never      Smokeless tobacco: Never      Referred by: No ref. provider found    PCP:Joceline Sal PA Jeffrey Thomas Johnston, MD    Elements of this note have been dictated using speech recognition software. As a result, errors of speech recognition may have occurred.

## 2024-12-23 ENCOUNTER — OFFICE VISIT (OUTPATIENT)
Dept: PULMONOLOGY | Age: 74
End: 2024-12-23
Payer: MEDICARE

## 2024-12-23 VITALS
RESPIRATION RATE: 14 BRPM | TEMPERATURE: 97.5 F | HEIGHT: 64 IN | OXYGEN SATURATION: 97 % | WEIGHT: 129 LBS | BODY MASS INDEX: 22.02 KG/M2 | DIASTOLIC BLOOD PRESSURE: 88 MMHG | HEART RATE: 56 BPM | SYSTOLIC BLOOD PRESSURE: 145 MMHG

## 2024-12-23 DIAGNOSIS — R91.1 PULMONARY NODULE: Primary | ICD-10-CM

## 2024-12-23 DIAGNOSIS — J44.9 OBSTRUCTIVE LUNG DISEASE (GENERALIZED) (HCC): ICD-10-CM

## 2024-12-23 DIAGNOSIS — A31.0 MYCOBACTERIUM AVIUM COMPLEX (HCC): ICD-10-CM

## 2024-12-23 LAB
EXPIRATORY TIME: NORMAL
FEF 25-75% %PRED-PRE: NORMAL
FEF 25-75% PRED: NORMAL
FEF 25-75-PRE: NORMAL
FEV1 %PRED-PRE: 44 %
FEV1 PRED: 2.09 L
FEV1/FVC %PRED-PRE: NORMAL
FEV1/FVC PRED: NORMAL
FEV1/FVC: 58 %
FEV1: 0.91 L
FVC %PRED-PRE: 57 %
FVC PRED: 2.71 L
FVC: 1.56 L
PEF %PRED-PRE: NORMAL
PEF PRED: NORMAL
PEF-PRE: NORMAL

## 2024-12-23 PROCEDURE — 3023F SPIROM DOC REV: CPT | Performed by: STUDENT IN AN ORGANIZED HEALTH CARE EDUCATION/TRAINING PROGRAM

## 2024-12-23 PROCEDURE — 99205 OFFICE O/P NEW HI 60 MIN: CPT | Performed by: STUDENT IN AN ORGANIZED HEALTH CARE EDUCATION/TRAINING PROGRAM

## 2024-12-23 PROCEDURE — 94010 BREATHING CAPACITY TEST: CPT | Performed by: STUDENT IN AN ORGANIZED HEALTH CARE EDUCATION/TRAINING PROGRAM

## 2024-12-23 PROCEDURE — 1090F PRES/ABSN URINE INCON ASSESS: CPT | Performed by: STUDENT IN AN ORGANIZED HEALTH CARE EDUCATION/TRAINING PROGRAM

## 2024-12-23 PROCEDURE — G8482 FLU IMMUNIZE ORDER/ADMIN: HCPCS | Performed by: STUDENT IN AN ORGANIZED HEALTH CARE EDUCATION/TRAINING PROGRAM

## 2024-12-23 PROCEDURE — 3079F DIAST BP 80-89 MM HG: CPT | Performed by: STUDENT IN AN ORGANIZED HEALTH CARE EDUCATION/TRAINING PROGRAM

## 2024-12-23 PROCEDURE — G8427 DOCREV CUR MEDS BY ELIG CLIN: HCPCS | Performed by: STUDENT IN AN ORGANIZED HEALTH CARE EDUCATION/TRAINING PROGRAM

## 2024-12-23 PROCEDURE — 3077F SYST BP >= 140 MM HG: CPT | Performed by: STUDENT IN AN ORGANIZED HEALTH CARE EDUCATION/TRAINING PROGRAM

## 2024-12-23 PROCEDURE — G8420 CALC BMI NORM PARAMETERS: HCPCS | Performed by: STUDENT IN AN ORGANIZED HEALTH CARE EDUCATION/TRAINING PROGRAM

## 2024-12-23 PROCEDURE — 1123F ACP DISCUSS/DSCN MKR DOCD: CPT | Performed by: STUDENT IN AN ORGANIZED HEALTH CARE EDUCATION/TRAINING PROGRAM

## 2024-12-23 PROCEDURE — G8400 PT W/DXA NO RESULTS DOC: HCPCS | Performed by: STUDENT IN AN ORGANIZED HEALTH CARE EDUCATION/TRAINING PROGRAM

## 2024-12-23 PROCEDURE — 1036F TOBACCO NON-USER: CPT | Performed by: STUDENT IN AN ORGANIZED HEALTH CARE EDUCATION/TRAINING PROGRAM

## 2024-12-23 PROCEDURE — 3017F COLORECTAL CA SCREEN DOC REV: CPT | Performed by: STUDENT IN AN ORGANIZED HEALTH CARE EDUCATION/TRAINING PROGRAM

## 2024-12-23 PROCEDURE — 1159F MED LIST DOCD IN RCRD: CPT | Performed by: STUDENT IN AN ORGANIZED HEALTH CARE EDUCATION/TRAINING PROGRAM

## 2024-12-23 RX ORDER — ALBUTEROL SULFATE 90 UG/1
2 INHALANT RESPIRATORY (INHALATION) EVERY 6 HOURS PRN
Qty: 1 EACH | Refills: 11 | Status: SHIPPED | OUTPATIENT
Start: 2024-12-23

## 2024-12-23 ASSESSMENT — PULMONARY FUNCTION TESTS
FEV1/FVC: 58
FEV1_PREDICTED: 2.09
FVC_PREDICTED: 2.71
FEV1_PERCENT_PREDICTED_PRE: 44
FEV1: 0.91
FVC: 1.56
FVC_PERCENT_PREDICTED_PRE: 57

## 2024-12-23 NOTE — PROGRESS NOTES
Name:  Rosalee Dickinson      YOB: 1950       MRN: [id number]        Office Visit: 12/23/2024    ASSESSMENT & PLAN (Medical Decision Making)  Pt is a 74 y.o. 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     1. Pulmonary nodules - concern for ongoing NTM given cavitation in similar distribution in this never smoker. While biopsy is possible for several of these, she has a known MAC infection and I would favor this as an etiology. We discussed an aggressive approach to evaluate these such as biopsy vs continuing to watch and initiating treatment for MAC. They are progressive but she has had a resection prior for this process and pt is reticent to pursue very invasive workup. Reasonable to discuss with ID and consider treatment vs bronchoscopy.   2. Mycobacterium avium complex (HCC) - untreated, with gradually progressive disease on imaging noted. Pt is interested in discussing treatment and given her ongoing sxs.   - Amb External Referral To Infectious Disease - please discuss tx of MAC  - low threshold to pursue bronchoscopy to confirm lesions are related to MAC and r/o malignancy, reasonable to d/w ID first    3. Obstructive lung disease (generalized) (HCC) - noted on prior vangie PFT testing, not on inhaled medications for this - will better characterize with PFTs but see if albuterol is helpful (no current wheezing, overt shortness of breath). She was prescribed trelegy recently but has not started this yet.    - Trial of albuterol to see if this is helpful   - cPFTs prior to next visit    Return in about 3 months (around 3/23/2025) for alfredito/ alfredito Lozano/ Tor[place].  Wali Lozano MD    Total time for encounter on day of encounter was 50 minutes.  This time

## 2025-01-21 ENCOUNTER — INITIAL CONSULT (OUTPATIENT)
Age: 75
End: 2025-01-21
Payer: MEDICARE

## 2025-01-21 VITALS
DIASTOLIC BLOOD PRESSURE: 82 MMHG | HEART RATE: 58 BPM | BODY MASS INDEX: 22.71 KG/M2 | HEIGHT: 64 IN | WEIGHT: 133 LBS | SYSTOLIC BLOOD PRESSURE: 136 MMHG

## 2025-01-21 DIAGNOSIS — Z76.89 ENCOUNTER TO ESTABLISH CARE: Primary | ICD-10-CM

## 2025-01-21 DIAGNOSIS — I71.012 DISSECTING ANEURYSM OF THORACIC AORTA, STANFORD TYPE B (HCC): ICD-10-CM

## 2025-01-21 DIAGNOSIS — I10 ESSENTIAL HYPERTENSION: ICD-10-CM

## 2025-01-21 DIAGNOSIS — R06.02 SHORTNESS OF BREATH: ICD-10-CM

## 2025-01-21 DIAGNOSIS — E78.00 HYPERCHOLESTEROLEMIA: Chronic | ICD-10-CM

## 2025-01-21 PROCEDURE — 99204 OFFICE O/P NEW MOD 45 MIN: CPT | Performed by: INTERNAL MEDICINE

## 2025-01-21 PROCEDURE — 3079F DIAST BP 80-89 MM HG: CPT | Performed by: INTERNAL MEDICINE

## 2025-01-21 PROCEDURE — G8428 CUR MEDS NOT DOCUMENT: HCPCS | Performed by: INTERNAL MEDICINE

## 2025-01-21 PROCEDURE — 1090F PRES/ABSN URINE INCON ASSESS: CPT | Performed by: INTERNAL MEDICINE

## 2025-01-21 PROCEDURE — 1036F TOBACCO NON-USER: CPT | Performed by: INTERNAL MEDICINE

## 2025-01-21 PROCEDURE — G8420 CALC BMI NORM PARAMETERS: HCPCS | Performed by: INTERNAL MEDICINE

## 2025-01-21 PROCEDURE — G8400 PT W/DXA NO RESULTS DOC: HCPCS | Performed by: INTERNAL MEDICINE

## 2025-01-21 PROCEDURE — 3075F SYST BP GE 130 - 139MM HG: CPT | Performed by: INTERNAL MEDICINE

## 2025-01-21 PROCEDURE — 1123F ACP DISCUSS/DSCN MKR DOCD: CPT | Performed by: INTERNAL MEDICINE

## 2025-01-21 PROCEDURE — 3017F COLORECTAL CA SCREEN DOC REV: CPT | Performed by: INTERNAL MEDICINE

## 2025-01-21 PROCEDURE — 93000 ELECTROCARDIOGRAM COMPLETE: CPT | Performed by: INTERNAL MEDICINE

## 2025-01-21 PROCEDURE — 1126F AMNT PAIN NOTED NONE PRSNT: CPT | Performed by: INTERNAL MEDICINE

## 2025-01-21 RX ORDER — METOPROLOL SUCCINATE 100 MG/1
100 TABLET, EXTENDED RELEASE ORAL DAILY
Qty: 90 TABLET | Refills: 3 | Status: SHIPPED | OUTPATIENT
Start: 2025-01-21 | End: 2027-05-11

## 2025-01-21 RX ORDER — ATORVASTATIN CALCIUM 10 MG/1
10 TABLET, FILM COATED ORAL DAILY
Qty: 90 TABLET | Refills: 3 | Status: SHIPPED | OUTPATIENT
Start: 2025-01-21

## 2025-01-21 RX ORDER — AMLODIPINE BESYLATE 5 MG/1
5 TABLET ORAL DAILY
Qty: 90 TABLET | Refills: 3 | Status: SHIPPED | OUTPATIENT
Start: 2025-01-21

## 2025-01-21 RX ORDER — AMLODIPINE BESYLATE 5 MG/1
5 TABLET ORAL DAILY
Qty: 30 TABLET | COMMUNITY
Start: 2025-01-21 | End: 2026-01-21

## 2025-01-21 NOTE — PROGRESS NOTES
\"LABA1C\"  No results found for: \"EAG\"      Lab Results   Component Value Date    CHOL 136 10/14/2024     Lab Results   Component Value Date    TRIG 110 10/14/2024     Lab Results   Component Value Date    HDL 95 (A) 10/14/2024     No components found for: \"LDLCHOLESTEROL\", \"LDLCALC\"  Lab Results   Component Value Date    VLDL 22 10/14/2024     Lab Results   Component Value Date    CHOLHDLRATIO 1.4 10/14/2024     Lab Results   Component Value Date    LDL 19 10/14/2024               I have Independently reviewed prior care notes, any ER records available, cardiac testing, labs and results with the patient and before seeing the patient today.  Also independently reviewed outside records when available.       ASSESSMENT:    Rosalee was seen today for consultation and shortness of breath.    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  Given these risk factors and symptoms as outlined, plan for NST.  We did review options of NST, LHC, other stress testing and agreed to proceed with NST in our office.  To ER for worsening angina.  Plan on definitive LHC for worsening angina.    No valve dz on MRI.  HTN: remain on meds  Type B Dissection:  followed yearly by vascular.  On BB. Non-smoker.   HPL: on statin.       Patient has been instructed and agrees to call our office with any issues or other concerns related to their cardiac condition(s) and/or

## 2025-02-03 ENCOUNTER — OFFICE VISIT (OUTPATIENT)
Dept: ORTHOPEDIC SURGERY | Age: 75
End: 2025-02-03
Payer: MEDICARE

## 2025-02-03 ENCOUNTER — TELEPHONE (OUTPATIENT)
Age: 75
End: 2025-02-03

## 2025-02-03 DIAGNOSIS — S92.352A CLOSED DISPLACED FRACTURE OF FIFTH METATARSAL BONE OF LEFT FOOT, INITIAL ENCOUNTER: ICD-10-CM

## 2025-02-03 DIAGNOSIS — S99.922A FOOT INJURY, LEFT, INITIAL ENCOUNTER: Primary | ICD-10-CM

## 2025-02-03 PROCEDURE — 99214 OFFICE O/P EST MOD 30 MIN: CPT | Performed by: ORTHOPAEDIC SURGERY

## 2025-02-03 PROCEDURE — G8420 CALC BMI NORM PARAMETERS: HCPCS | Performed by: ORTHOPAEDIC SURGERY

## 2025-02-03 PROCEDURE — M5017 MISC EVENUP: HCPCS | Performed by: ORTHOPAEDIC SURGERY

## 2025-02-03 PROCEDURE — G8400 PT W/DXA NO RESULTS DOC: HCPCS | Performed by: ORTHOPAEDIC SURGERY

## 2025-02-03 PROCEDURE — L4361 PNEUMA/VAC WALK BOOT PRE OTS: HCPCS | Performed by: ORTHOPAEDIC SURGERY

## 2025-02-03 PROCEDURE — 1123F ACP DISCUSS/DSCN MKR DOCD: CPT | Performed by: ORTHOPAEDIC SURGERY

## 2025-02-03 PROCEDURE — 1036F TOBACCO NON-USER: CPT | Performed by: ORTHOPAEDIC SURGERY

## 2025-02-03 PROCEDURE — G8428 CUR MEDS NOT DOCUMENT: HCPCS | Performed by: ORTHOPAEDIC SURGERY

## 2025-02-03 PROCEDURE — M5009 MISC POST OP SHOE: HCPCS | Performed by: ORTHOPAEDIC SURGERY

## 2025-02-03 PROCEDURE — 1090F PRES/ABSN URINE INCON ASSESS: CPT | Performed by: ORTHOPAEDIC SURGERY

## 2025-02-03 PROCEDURE — 3017F COLORECTAL CA SCREEN DOC REV: CPT | Performed by: ORTHOPAEDIC SURGERY

## 2025-02-03 NOTE — TELEPHONE ENCOUNTER
----- Message from Dr. Andrew Sal, DO sent at 1/31/2025  1:06 PM EST -----  Please call the patient.  NST was ok, nothing major or concerning.  If having more angina (worsening NAGEL, CP, SOB), please let us know.  Will review more at their follow up appointment and get plan for the future.    ThanksDoron

## 2025-02-03 NOTE — PROGRESS NOTES
Name: Rosalee Dickinson  YOB: 1950  Gender: female  MRN: [id number]     CC: Left foot injury    HPI:   01/31/2025: Left foot injury:  02/03/2025: Initial visit: Left foot injury    ROS/Meds/PSH/PMH/FH/SH: Only reviewed pertinent/relevant information      Tobacco:  reports that she has never smoked. She has never used smokeless tobacco.     Reviewed Test/Records/Documents:   2023: Dr. 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 Cheng: [facility] vascular: Type B thoracic aortic dissection:  01/21/2025: [facility] cardiology: HTN: Hypercholesterolemia: Thoracic dissecting aneurysm [place] type B: Shortness of breath pending nuclear stress test with myocardial perfusion:    Physical Examination:  Patient appears to be alert and oriented with acceptable appearance.  No obvious distress or SOB  CV: LE acceptable vascular flow, color and capillary refill  Neuro: LE mostly intact light touch sensation   Skin: Left = dorsal midfoot-forefoot soft tissue swelling/bruising  MS: Standing: Plantigrade: Gait protected with crutches  Left = no ankle or hindfoot appreciable pain  Left = central midfoot pain     XR: Left: Standing AP lateral mortise ankle plus AP oblique foot taken today 02/03/2025 with comminuted intra-articular 2nd metatarsal base fracture with no gross evidence for Lis Franc diastases  XR Impression:  As above      XR: Left: Standing AP lateral oblique foot taken on return  XR Impression:  As above      Assessment:    Left midfoot injury: Comminuted intra-articular 2nd metatarsal base fracture without gross Lisfranc spread  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 We explored treatment options.     02/03/2025: Initial visit:   Left midfoot injury:

## 2025-02-03 NOTE — PROGRESS NOTES
Patient was fitted and instructed on an Even Up for the right foot.  Patient was fitted and instructed on a Post Op Shoe for the left foot.   The patient was prescribed a walker boot for the patient's left foot. The patient wears a size NA shoe and I fitted them with a M size boot. The patient was fitted and instructed on the use of prescribed walker boot by a Registered Orthopedic Technician. I explained how to fit themselves and that the plastic flexible piece should always be on the front of the boot and secured by the Velcro straps on top. The air bladder in the boot was adjusted according to proper fit and comfort. The patient walked a short distance and acknowledged satisfaction with current fit. I also explained that they need a heel lift or a higher heeled shoe for the uninvolved LE to help normalize gait and avoid excessive low back stress/strain due to leg length inequality created from walker boot.Patient read and signed documenting they understand and agree to [facility]'s current DME return policy.     The above DME items were also checked for same/similar on the Medicare portal. An ABN was issued if necessary.

## 2025-02-13 ENCOUNTER — OFFICE VISIT (OUTPATIENT)
Dept: PRIMARY CARE CLINIC | Facility: CLINIC | Age: 75
End: 2025-02-13

## 2025-02-13 VITALS
HEIGHT: 64 IN | BODY MASS INDEX: 22.71 KG/M2 | SYSTOLIC BLOOD PRESSURE: 114 MMHG | HEART RATE: 57 BPM | DIASTOLIC BLOOD PRESSURE: 74 MMHG | OXYGEN SATURATION: 96 % | TEMPERATURE: 97.9 F | WEIGHT: 133 LBS

## 2025-02-13 DIAGNOSIS — E78.00 HYPERCHOLESTEROLEMIA: Chronic | ICD-10-CM

## 2025-02-13 DIAGNOSIS — I71.012 DISSECTING ANEURYSM OF THORACIC AORTA, STANFORD TYPE B (HCC): ICD-10-CM

## 2025-02-13 DIAGNOSIS — M81.0 AGE-RELATED OSTEOPOROSIS WITHOUT CURRENT PATHOLOGICAL FRACTURE: ICD-10-CM

## 2025-02-13 DIAGNOSIS — J44.9 OBSTRUCTIVE LUNG DISEASE (GENERALIZED) (HCC): ICD-10-CM

## 2025-02-13 DIAGNOSIS — Z00.00 MEDICARE ANNUAL WELLNESS VISIT, SUBSEQUENT: Primary | ICD-10-CM

## 2025-02-13 DIAGNOSIS — A31.0 MYCOBACTERIUM AVIUM INFECTION (HCC): ICD-10-CM

## 2025-02-13 DIAGNOSIS — E83.110 HEMOCHROMATOSIS ASSOCIATED WITH MUTATION IN HFE GENE (HCC): ICD-10-CM

## 2025-02-13 DIAGNOSIS — K86.2 CYST OF PANCREAS: ICD-10-CM

## 2025-02-13 SDOH — ECONOMIC STABILITY: FOOD INSECURITY: WITHIN THE PAST 12 MONTHS, YOU WORRIED THAT YOUR FOOD WOULD RUN OUT BEFORE YOU GOT MONEY TO BUY MORE.: NEVER TRUE

## 2025-02-13 SDOH — ECONOMIC STABILITY: FOOD INSECURITY: WITHIN THE PAST 12 MONTHS, THE FOOD YOU BOUGHT JUST DIDN'T LAST AND YOU DIDN'T HAVE MONEY TO GET MORE.: NEVER TRUE

## 2025-02-13 ASSESSMENT — PATIENT HEALTH QUESTIONNAIRE - PHQ9
1. LITTLE INTEREST OR PLEASURE IN DOING THINGS: NOT AT ALL
SUM OF ALL RESPONSES TO PHQ QUESTIONS 1-9: 0
2. FEELING DOWN, DEPRESSED OR HOPELESS: NOT AT ALL
SUM OF ALL RESPONSES TO PHQ QUESTIONS 1-9: 0
SUM OF ALL RESPONSES TO PHQ9 QUESTIONS 1 & 2: 0

## 2025-02-13 ASSESSMENT — ENCOUNTER SYMPTOMS
NAUSEA: 0
SHORTNESS OF BREATH: 0
ABDOMINAL PAIN: 0
COUGH: 0
DIARRHEA: 0
VOMITING: 0

## 2025-02-13 ASSESSMENT — LIFESTYLE VARIABLES
HOW OFTEN DO YOU HAVE A DRINK CONTAINING ALCOHOL: NEVER
HOW MANY STANDARD DRINKS CONTAINING ALCOHOL DO YOU HAVE ON A TYPICAL DAY: PATIENT DOES NOT DRINK

## 2025-02-13 NOTE — ASSESSMENT & PLAN NOTE
Chronic, stable on Atorvastatin.  Will order lipid panel to be done with her next labs from Hematology.

## 2025-02-13 NOTE — ASSESSMENT & PLAN NOTE
S/P pulmonary wedge resection.  Infectious Disease referral pending per Pulmonology for MAC treatment as they believe this is causing her pulmonary nodules.

## 2025-02-13 NOTE — ASSESSMENT & PLAN NOTE
Chronic, has seen Gastroenterology who will repeat MRI next month, after one year from previous imaging.

## 2025-02-13 NOTE — ASSESSMENT & PLAN NOTE
Chronic, seeing Hematology who told her she can do therapeutic phlebotomy every two months.  Fibroscan pending per Gastroenterology to evaluate for liver fibrosis.

## 2025-02-13 NOTE — PROGRESS NOTES
Medicare Annual Wellness Visit    Rosalee Dickinson is here for Medicare AWV, Follow-up, and Skin Problem (Spot on left hand )    Assessment & Plan   Medicare annual wellness visit, subsequent  Obstructive lung disease (generalized) (HCC)  Assessment & Plan:   Questionable diagnosis from [facility], PFTs pending per Pulmonology.  Hypercholesterolemia  Assessment & Plan:   Chronic, stable on Atorvastatin.  Will order lipid panel to be done with her next labs from Hematology.  Orders:  -     Lipid Panel; Future  Age-related osteoporosis without current pathological fracture  Assessment & Plan:  Chronic, due for DEXA Scan.  Currently on Vitamin D3 and Oscal supplements.  Order placed.  Orders:  -     DEXA BONE DENSITY AXIAL SKELETON; Future  Mycobacterium avium infection (HCC)  Assessment & Plan:   S/P pulmonary wedge resection.  Infectious Disease referral pending per Pulmonology for MAC treatment as they believe this is causing her pulmonary nodules.    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  Fibroscan pending per Gastroenterology to evaluate for liver fibrosis.  Cyst of pancreas  Assessment & Plan:  Chronic, has seen Gastroenterology who will repeat MRI next month, after one year from previous imaging.  Dissecting aneurysm of thoracic aorta, Ok type B (HCC)  Assessment & Plan:   Chronic, has seen Vascular who has ordered repeat CT scan.     Return in 6 months (on 8/13/2025).     Subjective     73 yo female presents for her Medicare AWV and follow up.  Initially seen by our office three months ago to establish care, was transferring from [facility].  Notes that two weeks she tripped over her bed and was found to have a second metatarsal fracture.  Has seen Dr. Floyd, [facility].  Did not require surgery.  Is not having a lot of pain today.  Has seen Vascular Surgery for her history

## 2025-02-23 NOTE — PROGRESS NOTES
Name: Rosalee Dickinson  YOB: 1950  Gender: female  MRN: [id number]     02/24/2025: Doing well    HPI:   01/31/2025: Left foot injury:  02/03/2025: Initial visit: Left foot injury    ROS/Meds/PSH/PMH/FH/SH: Only reviewed pertinent/relevant information      Tobacco:  reports that she has never smoked. She has never used smokeless tobacco.     Reviewed Test/Records/Documents:   2023: Dr. 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 Cheng: [facility] vascular: Type B thoracic aortic dissection:  01/21/2025: [facility] cardiology: HTN: Hypercholesterolemia: Thoracic dissecting aneurysm [place] type B: Shortness of breath pending nuclear stress test with myocardial perfusion:    Physical Examination:  Patient appears to be alert and oriented with acceptable appearance.  No obvious distress or SOB  CV: LE acceptable vascular flow, color and capillary refill  Neuro: LE mostly intact light touch sensation   Skin: Left = midfoot thickening  MS: Standing: Plantigrade: Gait full 3D boot  Left = minimal midfoot pain     XR: Left: Standing AP lateral oblique foot taken today with healing comminuted intra-articular 2nd metatarsal base fracture; healing 1st metatarsal base fracture; no gross evidence for Lis Franc diastases  XR Impression:  As above      Assessment:     Left midfoot injury: 1st metatarsal base fracture; comminuted intra-articular 2nd metatarsal base fracture  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 We explored treatment options.     02/03/2025: Initial visit:   Left midfoot injury: Comminuted intra-articular 2nd metatarsal base fracture without gross Lis Franc spread  At this time, I see no indication for ORIF   We discussed care, treatment options and 3D

## 2025-02-24 ENCOUNTER — OFFICE VISIT (OUTPATIENT)
Dept: ORTHOPEDIC SURGERY | Age: 75
End: 2025-02-24
Payer: MEDICARE

## 2025-02-24 VITALS — BODY MASS INDEX: 22.71 KG/M2 | HEIGHT: 64 IN | WEIGHT: 133 LBS

## 2025-02-24 DIAGNOSIS — S92.302A MULTIPLE CLOSED FRACTURES OF METATARSAL BONE OF LEFT FOOT, INITIAL ENCOUNTER: Primary | ICD-10-CM

## 2025-02-24 PROCEDURE — G8400 PT W/DXA NO RESULTS DOC: HCPCS | Performed by: ORTHOPAEDIC SURGERY

## 2025-02-24 PROCEDURE — G8420 CALC BMI NORM PARAMETERS: HCPCS | Performed by: ORTHOPAEDIC SURGERY

## 2025-02-24 PROCEDURE — 1036F TOBACCO NON-USER: CPT | Performed by: ORTHOPAEDIC SURGERY

## 2025-02-24 PROCEDURE — G8428 CUR MEDS NOT DOCUMENT: HCPCS | Performed by: ORTHOPAEDIC SURGERY

## 2025-02-24 PROCEDURE — 1123F ACP DISCUSS/DSCN MKR DOCD: CPT | Performed by: ORTHOPAEDIC SURGERY

## 2025-02-24 PROCEDURE — 3017F COLORECTAL CA SCREEN DOC REV: CPT | Performed by: ORTHOPAEDIC SURGERY

## 2025-02-24 PROCEDURE — 99212 OFFICE O/P EST SF 10 MIN: CPT | Performed by: ORTHOPAEDIC SURGERY

## 2025-02-24 PROCEDURE — 1090F PRES/ABSN URINE INCON ASSESS: CPT | Performed by: ORTHOPAEDIC SURGERY

## 2025-03-06 ENCOUNTER — TELEPHONE (OUTPATIENT)
Age: 75
End: 2025-03-06

## 2025-03-06 NOTE — TELEPHONE ENCOUNTER
Received the following letter via EPIC on 3/4/25:      \"PROVIDER FEEDBACK LOOP CALLED 3X     Patient:Rosalee Dickinson  : 1950  Referring Provider: ARABELLA JOSÉ  Referral Type:  Imaging     Procedures:  61470 (CPT®) - MRI ABDOMEN W WO CONTRAST MRCP  (Canceled) 55862 (CPT®) - MRI ABDOMEN W WO CONTRAST MRCP  Date Service Ordered 2024        We were unable to reach Rosalee Dickinson to schedule the test ordered by your office after 3 outreach attempts via either text, email and/or phone call.  Please call/follow up with your patient to explain the significance of the ordered test and direct the patient to call Central Scheduling to schedule the test at their earliest convenience.     Please complete one of the following actions from Quick Actions buttons:     Route to Provider:  Route message to ordering provider to seek next steps in care plan.     Telephone Encounter:  Telephone encounter will open.  Call patient to explain significance of ordered test and direct patient to call Central Scheduling to schedule test then Document details of call.     Open Referral:  Review referral notes or details if needed.     Close Referral:  Referral will open.  Document in Notes section of referral why the referral is being closed.  Examples of referral closure:  Patient had test done outside of of an office in the [facility], Patient refuses test, Patient no longer having symptoms, Unable to reach patient.  Only close the referral if you are sure the test will not proceed.     Thank you     Pre-Access Scheduling Team\"

## 2025-03-06 NOTE — TELEPHONE ENCOUNTER
Called pt in response to \"PROVIDER FEEDBACK LOOP CALLED 3X\" letter from radiology scheduling department.     Spoke with pt and inquired if she was planning to continue following back up with [facility]Dr. Thornton [facility] showed upcoming appointments at both offices. Pt states she is continuing care with [facility]/Dr. Thornton.     Informed pt that our radiology department had tried to contact her and she should call them back at [phone] to make an appointment for the MRI and Fibroscans ordered by Dr. Thornton at last OV on 12/10/25. Pt verbalized understanding, wrote down number, and stated she would call to schedule imaging. Instructed pt to reach back out with any questions moving forward, pt agreeable.

## 2025-03-17 ENCOUNTER — OFFICE VISIT (OUTPATIENT)
Dept: PULMONOLOGY | Age: 75
End: 2025-03-17
Payer: MEDICARE

## 2025-03-17 ENCOUNTER — NURSE ONLY (OUTPATIENT)
Dept: PULMONOLOGY | Age: 75
End: 2025-03-17
Payer: MEDICARE

## 2025-03-17 VITALS
HEIGHT: 64 IN | OXYGEN SATURATION: 95 % | SYSTOLIC BLOOD PRESSURE: 108 MMHG | HEART RATE: 65 BPM | DIASTOLIC BLOOD PRESSURE: 76 MMHG | TEMPERATURE: 97.6 F | BODY MASS INDEX: 21 KG/M2 | WEIGHT: 123 LBS | RESPIRATION RATE: 15 BRPM

## 2025-03-17 DIAGNOSIS — J44.9 COPD, SEVERE (HCC): Primary | ICD-10-CM

## 2025-03-17 DIAGNOSIS — A31.0 MYCOBACTERIUM AVIUM COMPLEX (HCC): ICD-10-CM

## 2025-03-17 DIAGNOSIS — J44.9 OBSTRUCTIVE LUNG DISEASE (GENERALIZED) (HCC): Primary | ICD-10-CM

## 2025-03-17 DIAGNOSIS — R91.1 PULMONARY NODULE: ICD-10-CM

## 2025-03-17 LAB
FEV 1 , POC: 0.96 L
FEV1 % PRED, POC: 45 %
FEV1/FVC, POC: NORMAL
FVC % PRED, POC: 62 %
FVC, POC: NORMAL

## 2025-03-17 PROCEDURE — 1090F PRES/ABSN URINE INCON ASSESS: CPT | Performed by: STUDENT IN AN ORGANIZED HEALTH CARE EDUCATION/TRAINING PROGRAM

## 2025-03-17 PROCEDURE — 99214 OFFICE O/P EST MOD 30 MIN: CPT | Performed by: STUDENT IN AN ORGANIZED HEALTH CARE EDUCATION/TRAINING PROGRAM

## 2025-03-17 PROCEDURE — 94726 PLETHYSMOGRAPHY LUNG VOLUMES: CPT | Performed by: STUDENT IN AN ORGANIZED HEALTH CARE EDUCATION/TRAINING PROGRAM

## 2025-03-17 PROCEDURE — 1126F AMNT PAIN NOTED NONE PRSNT: CPT | Performed by: STUDENT IN AN ORGANIZED HEALTH CARE EDUCATION/TRAINING PROGRAM

## 2025-03-17 PROCEDURE — 3023F SPIROM DOC REV: CPT | Performed by: STUDENT IN AN ORGANIZED HEALTH CARE EDUCATION/TRAINING PROGRAM

## 2025-03-17 PROCEDURE — 94729 DIFFUSING CAPACITY: CPT | Performed by: STUDENT IN AN ORGANIZED HEALTH CARE EDUCATION/TRAINING PROGRAM

## 2025-03-17 PROCEDURE — G8400 PT W/DXA NO RESULTS DOC: HCPCS | Performed by: STUDENT IN AN ORGANIZED HEALTH CARE EDUCATION/TRAINING PROGRAM

## 2025-03-17 PROCEDURE — G8420 CALC BMI NORM PARAMETERS: HCPCS | Performed by: STUDENT IN AN ORGANIZED HEALTH CARE EDUCATION/TRAINING PROGRAM

## 2025-03-17 PROCEDURE — 1159F MED LIST DOCD IN RCRD: CPT | Performed by: STUDENT IN AN ORGANIZED HEALTH CARE EDUCATION/TRAINING PROGRAM

## 2025-03-17 PROCEDURE — 3078F DIAST BP <80 MM HG: CPT | Performed by: STUDENT IN AN ORGANIZED HEALTH CARE EDUCATION/TRAINING PROGRAM

## 2025-03-17 PROCEDURE — G8427 DOCREV CUR MEDS BY ELIG CLIN: HCPCS | Performed by: STUDENT IN AN ORGANIZED HEALTH CARE EDUCATION/TRAINING PROGRAM

## 2025-03-17 PROCEDURE — 3074F SYST BP LT 130 MM HG: CPT | Performed by: STUDENT IN AN ORGANIZED HEALTH CARE EDUCATION/TRAINING PROGRAM

## 2025-03-17 PROCEDURE — 1036F TOBACCO NON-USER: CPT | Performed by: STUDENT IN AN ORGANIZED HEALTH CARE EDUCATION/TRAINING PROGRAM

## 2025-03-17 PROCEDURE — 3017F COLORECTAL CA SCREEN DOC REV: CPT | Performed by: STUDENT IN AN ORGANIZED HEALTH CARE EDUCATION/TRAINING PROGRAM

## 2025-03-17 PROCEDURE — 94060 EVALUATION OF WHEEZING: CPT | Performed by: STUDENT IN AN ORGANIZED HEALTH CARE EDUCATION/TRAINING PROGRAM

## 2025-03-17 PROCEDURE — 1123F ACP DISCUSS/DSCN MKR DOCD: CPT | Performed by: STUDENT IN AN ORGANIZED HEALTH CARE EDUCATION/TRAINING PROGRAM

## 2025-03-17 ASSESSMENT — PULMONARY FUNCTION TESTS
FEV1_PERCENT_PREDICTED_POC: 45
FVC_PERCENT_PREDICTED_POC: 62

## 2025-03-17 NOTE — PROGRESS NOTES
speaking there has been waxing and waning of the nodules since the reference exam and so most if not all of these are likely inflammatory. The possibility of coexisting neoplastic disease could not be excluded and so clinical correlation will be needed with additional follow-up as felt appropriate.   IMPRESSION:   1. Waxing and waning lung nodules, most likely infectious/inflammatory and probably on the basis of mycobacterial infection as given in the provided history     My read - mosaic attenuation. Nodules with central cavitation, examples include 8mm x 14mm ALTAF nodule. Similar ~1cm ALTAF nodule, RLL 12mm x 8mm, this process appears to involve all lung fields. When compared with 2020 CT Chest w/o, there are no nodular areas that have resolved, and in fact many of these areas appear larger or are new from that time.       2024 CT Chest showing multiple RLL nodular areas (L) compared to 2020 CT without these findings (R)      PFTs:   BENNY - PFTs performed on 10/15/2024 show moderate obstruction, normal gas exchange     3/17/25  cPFT  Severe obstruction w/ air trapping and hyperinflation, moderately reduced uncorrected DLCO.       TTE: none for review    See top of note for Assessment/Plan

## 2025-03-24 NOTE — PROGRESS NOTES
Name: Rosalee Dickinson  YOB: 1950  Gender: female  MRN: [id number]     02/24/2025: Doing well  03/24/2025: In Altra shoes doing well    HPI:   01/31/2025: Left foot injury:  02/03/2025: Initial visit: Left foot injury    ROS/Meds/PSH/PMH/FH/SH: Only reviewed pertinent/relevant information      Tobacco:  reports that she has never smoked. She has never used smokeless tobacco.     Reviewed Test/Records/Documents:   2023: Dr. 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 Cheng: [facility] vascular: Type B thoracic aortic dissection:  01/21/2025: [facility] cardiology: HTN: Hypercholesterolemia: Thoracic dissecting aneurysm Ok type B: Shortness of breath pending nuclear stress test with myocardial perfusion:    Physical Examination:  Patient appears to be alert and oriented with acceptable appearance.  No obvious distress or SOB  CV: LE acceptable vascular flow, color and capillary refill  Neuro: LE mostly intact light touch sensation   Skin: Left = midfoot thickening  MS: Standing: Plantigrade: Gait age-related  Left = no reproducible midfoot pain     XR: Left: Standing AP lateral oblique foot taken today with healing comminuted intra-articular 2-3 metatarsal base fractures; healing 1st metatarsal base fracture; minimal Lis Franc diastases  XR Impression:  As above      Assessment:     Left midfoot injury: 1st metatarsal base fracture; comminuted intra-articular 2-3 metatarsal base fractures  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 We explored treatment options.     02/03/2025: Initial visit:   Left midfoot injury: Comminuted intra-articular 2nd metatarsal base fracture without gross Lis Franc spread  At this time, I see no indication for ORIF   We

## 2025-03-25 ENCOUNTER — OFFICE VISIT (OUTPATIENT)
Dept: ORTHOPEDIC SURGERY | Age: 75
End: 2025-03-25
Payer: MEDICARE

## 2025-03-25 DIAGNOSIS — S92.302G MULTIPLE CLOSED FRACTURES OF METATARSAL BONE OF LEFT FOOT WITH DELAYED HEALING, SUBSEQUENT ENCOUNTER: Primary | ICD-10-CM

## 2025-03-25 PROCEDURE — G8428 CUR MEDS NOT DOCUMENT: HCPCS | Performed by: ORTHOPAEDIC SURGERY

## 2025-03-25 PROCEDURE — 1123F ACP DISCUSS/DSCN MKR DOCD: CPT | Performed by: ORTHOPAEDIC SURGERY

## 2025-03-25 PROCEDURE — G8400 PT W/DXA NO RESULTS DOC: HCPCS | Performed by: ORTHOPAEDIC SURGERY

## 2025-03-25 PROCEDURE — 99212 OFFICE O/P EST SF 10 MIN: CPT | Performed by: ORTHOPAEDIC SURGERY

## 2025-03-25 PROCEDURE — 1090F PRES/ABSN URINE INCON ASSESS: CPT | Performed by: ORTHOPAEDIC SURGERY

## 2025-03-25 PROCEDURE — G8420 CALC BMI NORM PARAMETERS: HCPCS | Performed by: ORTHOPAEDIC SURGERY

## 2025-03-25 PROCEDURE — 3017F COLORECTAL CA SCREEN DOC REV: CPT | Performed by: ORTHOPAEDIC SURGERY

## 2025-03-25 PROCEDURE — 1036F TOBACCO NON-USER: CPT | Performed by: ORTHOPAEDIC SURGERY

## 2025-04-03 ENCOUNTER — HOSPITAL ENCOUNTER (OUTPATIENT)
Dept: LAB | Age: 75
Discharge: HOME | End: 2025-04-03
Payer: MEDICARE

## 2025-04-03 ENCOUNTER — OFFICE VISIT (OUTPATIENT)
Dept: ONCOLOGY | Age: 75
End: 2025-04-03
Payer: MEDICARE

## 2025-04-03 VITALS
HEIGHT: 64 IN | HEART RATE: 58 BPM | SYSTOLIC BLOOD PRESSURE: 123 MMHG | OXYGEN SATURATION: 100 % | DIASTOLIC BLOOD PRESSURE: 74 MMHG | BODY MASS INDEX: 21.95 KG/M2 | TEMPERATURE: 97.9 F | WEIGHT: 128.6 LBS | RESPIRATION RATE: 12 BRPM

## 2025-04-03 DIAGNOSIS — D49.0 IPMN (INTRADUCTAL PAPILLARY MUCINOUS NEOPLASM): ICD-10-CM

## 2025-04-03 DIAGNOSIS — E83.110 HEMOCHROMATOSIS ASSOCIATED WITH MUTATION IN HFE GENE: Primary | ICD-10-CM

## 2025-04-03 DIAGNOSIS — E83.110 HEMOCHROMATOSIS ASSOCIATED WITH MUTATION IN HFE GENE: ICD-10-CM

## 2025-04-03 LAB
ALBUMIN SERPL-MCNC: 3.7 G/DL (ref 3.2–4.6)
ALBUMIN/GLOB SERPL: 0.9 (ref 1–1.9)
ALP SERPL-CCNC: 105 U/L (ref 35–104)
ALT SERPL-CCNC: 35 U/L (ref 8–45)
ANION GAP SERPL CALC-SCNC: 10 MMOL/L (ref 7–16)
AST SERPL-CCNC: 74 U/L (ref 15–37)
BASOPHILS # BLD: 0.07 K/UL (ref 0–0.2)
BASOPHILS NFR BLD: 1.1 % (ref 0–2)
BILIRUB SERPL-MCNC: 0.6 MG/DL (ref 0–1.2)
BUN SERPL-MCNC: 19 MG/DL (ref 8–23)
CALCIUM SERPL-MCNC: 10 MG/DL (ref 8.8–10.2)
CHLORIDE SERPL-SCNC: 98 MMOL/L (ref 98–107)
CO2 SERPL-SCNC: 28 MMOL/L (ref 20–29)
CREAT SERPL-MCNC: 1.06 MG/DL (ref 0.6–1.1)
DIFFERENTIAL METHOD BLD: ABNORMAL
EOSINOPHIL # BLD: 0.14 K/UL (ref 0–0.8)
EOSINOPHIL NFR BLD: 2.2 % (ref 0.5–7.8)
ERYTHROCYTE [DISTWIDTH] IN BLOOD BY AUTOMATED COUNT: 14.4 % (ref 11.9–14.6)
FERRITIN SERPL-MCNC: 172 NG/ML (ref 8–388)
GLOBULIN SER CALC-MCNC: 3.9 G/DL (ref 2.3–3.5)
GLUCOSE SERPL-MCNC: 97 MG/DL (ref 70–99)
HCT VFR BLD AUTO: 41.5 % (ref 35.8–46.3)
HGB BLD-MCNC: 13.8 G/DL (ref 11.7–15.4)
IMM GRANULOCYTES # BLD AUTO: 0.01 K/UL (ref 0–0.5)
IMM GRANULOCYTES NFR BLD AUTO: 0.2 % (ref 0–5)
LYMPHOCYTES # BLD: 1.51 K/UL (ref 0.5–4.6)
LYMPHOCYTES NFR BLD: 23.7 % (ref 13–44)
MAGNESIUM SERPL-MCNC: 1.8 MG/DL (ref 1.8–2.4)
MCH RBC QN AUTO: 34.4 PG (ref 26.1–32.9)
MCHC RBC AUTO-ENTMCNC: 33.3 G/DL (ref 31.4–35)
MCV RBC AUTO: 103.5 FL (ref 82–102)
MONOCYTES # BLD: 0.87 K/UL (ref 0.1–1.3)
MONOCYTES NFR BLD: 13.6 % (ref 4–12)
NEUTS SEG # BLD: 3.78 K/UL (ref 1.7–8.2)
NEUTS SEG NFR BLD: 59.2 % (ref 43–78)
NRBC # BLD: 0 K/UL (ref 0–0.2)
PLATELET # BLD AUTO: 255 K/UL (ref 150–450)
PMV BLD AUTO: 9.1 FL (ref 9.4–12.3)
POTASSIUM SERPL-SCNC: 4.3 MMOL/L (ref 3.5–5.1)
PROT SERPL-MCNC: 7.6 G/DL (ref 6.3–8.2)
RBC # BLD AUTO: 4.01 M/UL (ref 4.05–5.2)
SODIUM SERPL-SCNC: 136 MMOL/L (ref 136–145)
WBC # BLD AUTO: 6.4 K/UL (ref 4.3–11.1)

## 2025-04-03 PROCEDURE — 1036F TOBACCO NON-USER: CPT | Performed by: INTERNAL MEDICINE

## 2025-04-03 PROCEDURE — 80053 COMPREHEN METABOLIC PANEL: CPT

## 2025-04-03 PROCEDURE — 3074F SYST BP LT 130 MM HG: CPT | Performed by: INTERNAL MEDICINE

## 2025-04-03 PROCEDURE — 3078F DIAST BP <80 MM HG: CPT | Performed by: INTERNAL MEDICINE

## 2025-04-03 PROCEDURE — G8400 PT W/DXA NO RESULTS DOC: HCPCS | Performed by: INTERNAL MEDICINE

## 2025-04-03 PROCEDURE — 1160F RVW MEDS BY RX/DR IN RCRD: CPT | Performed by: INTERNAL MEDICINE

## 2025-04-03 PROCEDURE — 83735 ASSAY OF MAGNESIUM: CPT

## 2025-04-03 PROCEDURE — 82728 ASSAY OF FERRITIN: CPT

## 2025-04-03 PROCEDURE — G8420 CALC BMI NORM PARAMETERS: HCPCS | Performed by: INTERNAL MEDICINE

## 2025-04-03 PROCEDURE — 85025 COMPLETE CBC W/AUTO DIFF WBC: CPT

## 2025-04-03 PROCEDURE — 99214 OFFICE O/P EST MOD 30 MIN: CPT | Performed by: INTERNAL MEDICINE

## 2025-04-03 PROCEDURE — 3017F COLORECTAL CA SCREEN DOC REV: CPT | Performed by: INTERNAL MEDICINE

## 2025-04-03 PROCEDURE — 1123F ACP DISCUSS/DSCN MKR DOCD: CPT | Performed by: INTERNAL MEDICINE

## 2025-04-03 PROCEDURE — 1090F PRES/ABSN URINE INCON ASSESS: CPT | Performed by: INTERNAL MEDICINE

## 2025-04-03 PROCEDURE — 1126F AMNT PAIN NOTED NONE PRSNT: CPT | Performed by: INTERNAL MEDICINE

## 2025-04-03 PROCEDURE — 1159F MED LIST DOCD IN RCRD: CPT | Performed by: INTERNAL MEDICINE

## 2025-04-03 PROCEDURE — 36415 COLL VENOUS BLD VENIPUNCTURE: CPT

## 2025-04-03 PROCEDURE — G8427 DOCREV CUR MEDS BY ELIG CLIN: HCPCS | Performed by: INTERNAL MEDICINE

## 2025-04-03 ASSESSMENT — PATIENT HEALTH QUESTIONNAIRE - PHQ9
SUM OF ALL RESPONSES TO PHQ QUESTIONS 1-9: 0
SUM OF ALL RESPONSES TO PHQ QUESTIONS 1-9: 0
2. FEELING DOWN, DEPRESSED OR HOPELESS: NOT AT ALL
1. LITTLE INTEREST OR PLEASURE IN DOING THINGS: NOT AT ALL
SUM OF ALL RESPONSES TO PHQ QUESTIONS 1-9: 0
SUM OF ALL RESPONSES TO PHQ QUESTIONS 1-9: 0

## 2025-04-03 NOTE — PROGRESS NOTES
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 Stable 1.3 cm side branch IPMN dorsally along the pancreatic body without suspicious features.  2. New, diffuse hepatic iron deposition. Previous abdominal MRI demonstrated diffuse hepatic steatosis.     8/8/23 - MRI FERRISCAN - IRON OVERLOAD LIVER (CE)  Impression  Elevated liver iron concentration, as above     9/27/23 - Hereditary Hemochromatosis (CE)  Comment Result:  c.845G>A (p.Zhg880Plk) - Detected, heterozygous  c.187C>G (p.Dri31Kpf) - Not Detected  c.193A>T (p.Xuq37Wpk) - Not Detected  Not associated with increased risk to develop clinical symptoms of Hereditary Hemochromatosis. In symptomatic individuals, other causes of iron overload should be evaluated.  See Additional Information and Comments.      11/17/23 - Liver biopsy (CE)  Final Diagnosis  A.  Liver, biopsy:  Liver tissue with increased siderosis within hepatocytes and Kupffer cells (grade 3 out of 4).  See comment.  B.  Liver, biopsy:  Tissue sent out for special

## 2025-04-03 NOTE — PATIENT INSTRUCTIONS
Patient Information from Today's Visit    The members of your [facility] are listed below:    Physician Provider: Tamara Tobias Medical Oncologist  Advanced Practice Clinician: Erendira Orantes NP  Registered Nurse: Sherrill SMITH   Navigator:   Medical Assistant: Mercy MASON MA  : Kerrie CHAMBERS   Supportive Care Services: Maral HUDSON LMSW    Diagnosis: Hemochromatosis       Follow Up Instructions:   Reviewed labs  Phlebotomy next week.  After that EVERY 3 MONTH phlebotomy   Treatment Summary has been discussed and given to patient:      Current Labs:   No visits with results within 3 Day(s) from this visit.   Latest known visit with results is:   Nurse Only on 03/17/2025   Component Date Value Ref Range Status    FEV 1 , POC 03/17/2025 0.96  L Final    FEV1 % Pred POC 03/17/2025 45  % Final    FVC, POC 03/17/2025 1.75 L   Final    FVC % Pred POC 03/17/2025 62  % Final    FEV1/FVC, POC 03/17/2025 55%   Final                 Please refer to After Visit Summary or [employer] for upcoming appointment information. Please call our office for rescheduling needs at least 24 hours before your scheduled appointment time.If you have any questions regarding your upcoming schedule please reach out to your care team through [employer] or call [phone].     Please notify your assigned Nurse Navigator of any unplanned hospital admissions or Emergency Room visits within 24 hours of discharge.    -------------------------------------------------------------------------------------------------------------------  Please call our office at [phone] if you have any  of the following symptoms:   Fever of 100.5 or greater  Chills  Shortness of breath  Swelling or pain in one leg    After office hours an answering service is available and will contact a provider for emergencies or if you are experiencing any of the above symptoms.  SHERRILL STEVENSON RN

## 2025-04-05 ENCOUNTER — HOSPITAL ENCOUNTER (OUTPATIENT)
Dept: MRI IMAGING | Age: 75
End: 2025-04-05
Attending: INTERNAL MEDICINE
Payer: MEDICARE

## 2025-04-05 DIAGNOSIS — K86.2 PANCREATIC CYST: ICD-10-CM

## 2025-04-05 PROCEDURE — A9579 GAD-BASE MR CONTRAST NOS,1ML: HCPCS | Performed by: INTERNAL MEDICINE

## 2025-04-05 PROCEDURE — 6360000004 HC RX CONTRAST MEDICATION: Performed by: INTERNAL MEDICINE

## 2025-04-05 PROCEDURE — 74183 MRI ABD W/O CNTR FLWD CNTR: CPT

## 2025-04-05 RX ADMIN — GADOTERIDOL 11 ML: 279.3 INJECTION, SOLUTION INTRAVENOUS at 10:07

## 2025-04-28 ENCOUNTER — TELEPHONE (OUTPATIENT)
Dept: PULMONOLOGY | Age: 75
End: 2025-04-28

## 2025-04-28 ENCOUNTER — CLINICAL DOCUMENTATION (OUTPATIENT)
Dept: PULMONOLOGY | Age: 75
End: 2025-04-28

## 2025-04-28 NOTE — PROGRESS NOTES
Spoke with [facility], referral has not yet been found in their system.  I have spoken with Suy[facility] and referral along with supporting documentation have been faxed to that office.

## 2025-04-28 NOTE — TELEPHONE ENCOUNTER
Left message with who answered phone for patient to call and reschedule appointment with Dr Lozano.  ID office called and they expect to see patient in June.

## 2025-05-13 ENCOUNTER — OFFICE VISIT (OUTPATIENT)
Dept: PRIMARY CARE CLINIC | Facility: CLINIC | Age: 75
End: 2025-05-13
Payer: MEDICARE

## 2025-05-13 VITALS
HEIGHT: 64 IN | SYSTOLIC BLOOD PRESSURE: 126 MMHG | TEMPERATURE: 98.1 F | WEIGHT: 129 LBS | DIASTOLIC BLOOD PRESSURE: 86 MMHG | OXYGEN SATURATION: 96 % | BODY MASS INDEX: 22.02 KG/M2 | HEART RATE: 56 BPM

## 2025-05-13 DIAGNOSIS — E78.00 HYPERCHOLESTEROLEMIA: Primary | ICD-10-CM

## 2025-05-13 DIAGNOSIS — J44.9 OBSTRUCTIVE LUNG DISEASE (GENERALIZED) (HCC): ICD-10-CM

## 2025-05-13 DIAGNOSIS — E78.00 HYPERCHOLESTEROLEMIA: ICD-10-CM

## 2025-05-13 DIAGNOSIS — Z12.31 SCREENING MAMMOGRAM FOR BREAST CANCER: ICD-10-CM

## 2025-05-13 DIAGNOSIS — I71.012 DISSECTING ANEURYSM OF THORACIC AORTA, STANFORD TYPE B (HCC): ICD-10-CM

## 2025-05-13 DIAGNOSIS — K86.2 CYST OF PANCREAS: ICD-10-CM

## 2025-05-13 DIAGNOSIS — A31.0 MYCOBACTERIUM AVIUM INFECTION (HCC): ICD-10-CM

## 2025-05-13 LAB
ALBUMIN SERPL-MCNC: 3.6 G/DL (ref 3.2–4.6)
ALBUMIN/GLOB SERPL: 1 (ref 1–1.9)
ALP SERPL-CCNC: 107 U/L (ref 35–104)
ALT SERPL-CCNC: 41 U/L (ref 8–45)
ANION GAP SERPL CALC-SCNC: 11 MMOL/L (ref 7–16)
AST SERPL-CCNC: 88 U/L (ref 15–37)
BILIRUB SERPL-MCNC: 1.3 MG/DL (ref 0–1.2)
BUN SERPL-MCNC: 13 MG/DL (ref 8–23)
CALCIUM SERPL-MCNC: 9.7 MG/DL (ref 8.8–10.2)
CHLORIDE SERPL-SCNC: 98 MMOL/L (ref 98–107)
CHOLEST SERPL-MCNC: 115 MG/DL (ref 0–200)
CO2 SERPL-SCNC: 29 MMOL/L (ref 20–29)
CREAT SERPL-MCNC: 0.86 MG/DL (ref 0.6–1.1)
GLOBULIN SER CALC-MCNC: 3.6 G/DL (ref 2.3–3.5)
GLUCOSE SERPL-MCNC: 97 MG/DL (ref 70–99)
HDLC SERPL-MCNC: 80 MG/DL (ref 40–60)
HDLC SERPL: 1.4 (ref 0–5)
LDLC SERPL CALC-MCNC: 21 MG/DL (ref 0–100)
POTASSIUM SERPL-SCNC: 4 MMOL/L (ref 3.5–5.1)
PROT SERPL-MCNC: 7.2 G/DL (ref 6.3–8.2)
SODIUM SERPL-SCNC: 139 MMOL/L (ref 136–145)
TRIGL SERPL-MCNC: 73 MG/DL (ref 0–150)
VLDLC SERPL CALC-MCNC: 15 MG/DL (ref 6–23)

## 2025-05-13 PROCEDURE — 3017F COLORECTAL CA SCREEN DOC REV: CPT | Performed by: FAMILY MEDICINE

## 2025-05-13 PROCEDURE — G8400 PT W/DXA NO RESULTS DOC: HCPCS | Performed by: FAMILY MEDICINE

## 2025-05-13 PROCEDURE — G8420 CALC BMI NORM PARAMETERS: HCPCS | Performed by: FAMILY MEDICINE

## 2025-05-13 PROCEDURE — 1123F ACP DISCUSS/DSCN MKR DOCD: CPT | Performed by: FAMILY MEDICINE

## 2025-05-13 PROCEDURE — 1159F MED LIST DOCD IN RCRD: CPT | Performed by: FAMILY MEDICINE

## 2025-05-13 PROCEDURE — G8427 DOCREV CUR MEDS BY ELIG CLIN: HCPCS | Performed by: FAMILY MEDICINE

## 2025-05-13 PROCEDURE — 3023F SPIROM DOC REV: CPT | Performed by: FAMILY MEDICINE

## 2025-05-13 PROCEDURE — 99214 OFFICE O/P EST MOD 30 MIN: CPT | Performed by: FAMILY MEDICINE

## 2025-05-13 PROCEDURE — 1126F AMNT PAIN NOTED NONE PRSNT: CPT | Performed by: FAMILY MEDICINE

## 2025-05-13 PROCEDURE — 3079F DIAST BP 80-89 MM HG: CPT | Performed by: FAMILY MEDICINE

## 2025-05-13 PROCEDURE — 1036F TOBACCO NON-USER: CPT | Performed by: FAMILY MEDICINE

## 2025-05-13 PROCEDURE — 3074F SYST BP LT 130 MM HG: CPT | Performed by: FAMILY MEDICINE

## 2025-05-13 PROCEDURE — 1090F PRES/ABSN URINE INCON ASSESS: CPT | Performed by: FAMILY MEDICINE

## 2025-05-13 ASSESSMENT — ENCOUNTER SYMPTOMS
COUGH: 0
DIARRHEA: 0
NAUSEA: 0
ABDOMINAL PAIN: 0
SHORTNESS OF BREATH: 0
VOMITING: 0

## 2025-05-13 ASSESSMENT — PATIENT HEALTH QUESTIONNAIRE - PHQ9
SUM OF ALL RESPONSES TO PHQ QUESTIONS 1-9: 0
SUM OF ALL RESPONSES TO PHQ QUESTIONS 1-9: 0
1. LITTLE INTEREST OR PLEASURE IN DOING THINGS: NOT AT ALL
SUM OF ALL RESPONSES TO PHQ QUESTIONS 1-9: 0
2. FEELING DOWN, DEPRESSED OR HOPELESS: NOT AT ALL
SUM OF ALL RESPONSES TO PHQ QUESTIONS 1-9: 0

## 2025-05-13 NOTE — PROGRESS NOTES
HENT:  Negative for congestion.    Respiratory:  Negative for cough and shortness of breath.    Cardiovascular:  Negative for chest pain.   Gastrointestinal:  Negative for abdominal pain, diarrhea, nausea and vomiting.   Psychiatric/Behavioral:  Negative for dysphoric mood.           PHYSICAL EXAMINATION    Vitals:    05/13/25 1338   BP: 126/86   Pulse: 56   Temp: 98.1 °F (36.7 °C)   SpO2: 96%         Physical Exam  Vitals and nursing note reviewed.   Constitutional:       General: She is not in acute distress.     Appearance: Normal appearance.   HENT:      Head: Normocephalic and atraumatic.      Right Ear: External ear normal.      Left Ear: External ear normal.      Nose: Nose normal.   Eyes:      Extraocular Movements: Extraocular movements intact.      Pupils: Pupils are equal, round, and reactive to light.   Cardiovascular:      Rate and Rhythm: Normal rate and regular rhythm.      Heart sounds: Normal heart sounds. No murmur heard.  Pulmonary:      Effort: Pulmonary effort is normal. No respiratory distress.      Breath sounds: Normal breath sounds.   Abdominal:      General: Bowel sounds are normal.      Palpations: Abdomen is soft.      Tenderness: There is no abdominal tenderness. There is no right CVA tenderness or left CVA tenderness.   Musculoskeletal:         General: Normal range of motion.      Cervical back: Normal range of motion.   Skin:     General: Skin is warm.      Findings: No rash.   Neurological:      General: No focal deficit present.      Mental Status: She is alert.   Psychiatric:         Mood and Affect: Mood normal.         Behavior: Behavior normal.             RESULTS    Lab Results   Component Value Date    CHOL 136 10/14/2024     Lab Results   Component Value Date    TRIG 110 10/14/2024     Lab Results   Component Value Date    HDL 95 (A) 10/14/2024     Lab Results   Component Value Date    LDL 19 10/14/2024     Lab Results   Component Value Date    VLDL 22 10/14/2024     Lab

## 2025-05-13 NOTE — ASSESSMENT & PLAN NOTE
Status post pulmonary wedge resection.  Sees infectious disease next month.  Believed by pulmonology to be the cause for her pulmonary nodules.

## 2025-05-13 NOTE — ASSESSMENT & PLAN NOTE
Resolved, recent MRI per gastroenterology shows a tortuous duct with normal-appearing pancreas.  No obvious cysts seen.

## 2025-05-13 NOTE — PATIENT INSTRUCTIONS
IT WAS GREAT TO SEE YOU TODAY!    I WILL CONTACT YOU WITH THE RESULTS OF YOUR LABS.    WE WILL SEE WHAT INFECTIOUS DISEASE SAYS ABOUT YOUR MYCOBACTERIUM AVIUM COMPLEX INFECTION.    I WILL SEE YOU AGAIN IN 4 MONTHS BUT PLEASE CALL WITH CONCERNS [phone]

## 2025-05-14 ENCOUNTER — RESULTS FOLLOW-UP (OUTPATIENT)
Dept: PRIMARY CARE CLINIC | Facility: CLINIC | Age: 75
End: 2025-05-14

## 2025-06-09 ENCOUNTER — OFFICE VISIT (OUTPATIENT)
Age: 75
End: 2025-06-09
Payer: MEDICARE

## 2025-06-09 VITALS
HEIGHT: 64 IN | SYSTOLIC BLOOD PRESSURE: 144 MMHG | HEART RATE: 66 BPM | BODY MASS INDEX: 21.51 KG/M2 | WEIGHT: 126 LBS | DIASTOLIC BLOOD PRESSURE: 90 MMHG

## 2025-06-09 DIAGNOSIS — E78.00 HYPERCHOLESTEROLEMIA: Chronic | ICD-10-CM

## 2025-06-09 DIAGNOSIS — J44.9 OBSTRUCTIVE LUNG DISEASE (GENERALIZED) (HCC): ICD-10-CM

## 2025-06-09 DIAGNOSIS — I71.012 DISSECTING ANEURYSM OF THORACIC AORTA, STANFORD TYPE B (HCC): ICD-10-CM

## 2025-06-09 DIAGNOSIS — R06.02 SHORTNESS OF BREATH: ICD-10-CM

## 2025-06-09 DIAGNOSIS — I10 ESSENTIAL HYPERTENSION: Primary | ICD-10-CM

## 2025-06-09 PROCEDURE — G8428 CUR MEDS NOT DOCUMENT: HCPCS | Performed by: INTERNAL MEDICINE

## 2025-06-09 PROCEDURE — 99214 OFFICE O/P EST MOD 30 MIN: CPT | Performed by: INTERNAL MEDICINE

## 2025-06-09 PROCEDURE — 1036F TOBACCO NON-USER: CPT | Performed by: INTERNAL MEDICINE

## 2025-06-09 PROCEDURE — G8420 CALC BMI NORM PARAMETERS: HCPCS | Performed by: INTERNAL MEDICINE

## 2025-06-09 PROCEDURE — 1126F AMNT PAIN NOTED NONE PRSNT: CPT | Performed by: INTERNAL MEDICINE

## 2025-06-09 PROCEDURE — 1090F PRES/ABSN URINE INCON ASSESS: CPT | Performed by: INTERNAL MEDICINE

## 2025-06-09 PROCEDURE — 3023F SPIROM DOC REV: CPT | Performed by: INTERNAL MEDICINE

## 2025-06-09 PROCEDURE — G8400 PT W/DXA NO RESULTS DOC: HCPCS | Performed by: INTERNAL MEDICINE

## 2025-06-09 PROCEDURE — 3017F COLORECTAL CA SCREEN DOC REV: CPT | Performed by: INTERNAL MEDICINE

## 2025-06-09 PROCEDURE — 1123F ACP DISCUSS/DSCN MKR DOCD: CPT | Performed by: INTERNAL MEDICINE

## 2025-06-09 PROCEDURE — 3080F DIAST BP >= 90 MM HG: CPT | Performed by: INTERNAL MEDICINE

## 2025-06-09 PROCEDURE — 3077F SYST BP >= 140 MM HG: CPT | Performed by: INTERNAL MEDICINE

## 2025-06-09 NOTE — PROGRESS NOTES
[address] [facility], [place] [address]  PHONE: [phone]     25    NAME:  Rosalee Dickinson  : 1950  MRN: [id number]       SUBJECTIVE:   Rosalee Dickinson is a 74 y.o. female seen for a follow up visit regarding the following:     Chief Complaint   Patient presents with    Shortness of Breath     NST        HPI:   type B aortic dissection in May 2016----followed by vascular yearly  Cardiac MRI 2024:  normal EF, No significant valvular abnormality. No evidence of infiltration, myocarditis, hypertrophic cardiomyopathy, or prior myocardial infarction   NST 2025: Findings suggest a low risk of cardiac events.      Some NAGEL, better with inhalers.    NO new CP, pressure.    Patient denies recent history of orthopnea, PND, excessive dizziness and/or syncope.  NO new edema.         Past Medical History, Past Surgical History, Family history, Social History, and Medications were all reviewed with the patient today and updated as necessary.     Current Outpatient Medications   Medication Sig Dispense Refill    tiotropium-olodaterol (STIOLTO RESPIMAT) 2.5-2.5 MCG/ACT AERS Inhale 2 puffs into the lungs daily 1 each 11    atorvastatin (LIPITOR) 10 MG tablet Take 1 tablet by mouth daily 90 tablet 3    amLODIPine (NORVASC) 5 MG tablet Take 1 tablet by mouth daily 90 tablet 3    metoprolol succinate (TOPROL XL) 100 MG extended release tablet Take 1 tablet by mouth daily PT STILL TAKING 90 tablet 3    albuterol sulfate HFA (PROVENTIL;VENTOLIN;PROAIR) 108 (90 Base) MCG/ACT inhaler Inhale 2 puffs into the lungs every 6 hours as needed for Wheezing 1 each 11    Melatonin 1 MG CAPS Take by mouth      aspirin 81 MG EC tablet Take 1 tablet by mouth daily      Multiple Vitamins-Minerals (ONCOVITE) TABS Take 1 tablet by mouth daily      cyanocobalamin 1000 MCG tablet Take 1 tablet by mouth daily      famotidine (PEPCID) 40 MG tablet Take 1 tablet by mouth 2 times daily      calcium carbonate (OSCAL) 500 MG TABS

## 2025-06-16 ENCOUNTER — HOSPITAL ENCOUNTER (OUTPATIENT)
Dept: CT IMAGING | Age: 75
Discharge: HOME | End: 2025-06-18
Attending: STUDENT IN AN ORGANIZED HEALTH CARE EDUCATION/TRAINING PROGRAM
Payer: MEDICARE

## 2025-06-16 DIAGNOSIS — I71.012 DISSECTING ANEURYSM OF THORACIC AORTA, STANFORD TYPE B (HCC): ICD-10-CM

## 2025-06-16 PROCEDURE — 71250 CT THORAX DX C-: CPT

## 2025-07-02 ENCOUNTER — HOSPITAL ENCOUNTER (OUTPATIENT)
Dept: MAMMOGRAPHY | Age: 75
Discharge: HOME | End: 2025-07-05
Attending: FAMILY MEDICINE
Payer: MEDICARE

## 2025-07-02 VITALS — HEIGHT: 64 IN | BODY MASS INDEX: 21.34 KG/M2 | WEIGHT: 125 LBS

## 2025-07-02 DIAGNOSIS — Z12.31 SCREENING MAMMOGRAM FOR BREAST CANCER: ICD-10-CM

## 2025-07-02 PROCEDURE — 77063 BREAST TOMOSYNTHESIS BI: CPT

## 2025-07-03 ENCOUNTER — OFFICE VISIT (OUTPATIENT)
Dept: VASCULAR SURGERY | Age: 75
End: 2025-07-03
Payer: MEDICARE

## 2025-07-03 VITALS
BODY MASS INDEX: 21.34 KG/M2 | HEART RATE: 46 BPM | WEIGHT: 125 LBS | SYSTOLIC BLOOD PRESSURE: 107 MMHG | HEIGHT: 64 IN | OXYGEN SATURATION: 93 % | DIASTOLIC BLOOD PRESSURE: 65 MMHG

## 2025-07-03 DIAGNOSIS — I71.012 DISSECTING ANEURYSM OF THORACIC AORTA, STANFORD TYPE B (HCC): ICD-10-CM

## 2025-07-03 DIAGNOSIS — I71.21 ANEURYSM OF ASCENDING AORTA WITHOUT RUPTURE: Primary | ICD-10-CM

## 2025-07-03 PROCEDURE — 1090F PRES/ABSN URINE INCON ASSESS: CPT | Performed by: STUDENT IN AN ORGANIZED HEALTH CARE EDUCATION/TRAINING PROGRAM

## 2025-07-03 PROCEDURE — 99214 OFFICE O/P EST MOD 30 MIN: CPT | Performed by: STUDENT IN AN ORGANIZED HEALTH CARE EDUCATION/TRAINING PROGRAM

## 2025-07-03 PROCEDURE — G8400 PT W/DXA NO RESULTS DOC: HCPCS | Performed by: STUDENT IN AN ORGANIZED HEALTH CARE EDUCATION/TRAINING PROGRAM

## 2025-07-03 PROCEDURE — 1123F ACP DISCUSS/DSCN MKR DOCD: CPT | Performed by: STUDENT IN AN ORGANIZED HEALTH CARE EDUCATION/TRAINING PROGRAM

## 2025-07-03 PROCEDURE — 3017F COLORECTAL CA SCREEN DOC REV: CPT | Performed by: STUDENT IN AN ORGANIZED HEALTH CARE EDUCATION/TRAINING PROGRAM

## 2025-07-03 PROCEDURE — G8420 CALC BMI NORM PARAMETERS: HCPCS | Performed by: STUDENT IN AN ORGANIZED HEALTH CARE EDUCATION/TRAINING PROGRAM

## 2025-07-03 PROCEDURE — G8428 CUR MEDS NOT DOCUMENT: HCPCS | Performed by: STUDENT IN AN ORGANIZED HEALTH CARE EDUCATION/TRAINING PROGRAM

## 2025-07-03 PROCEDURE — G2211 COMPLEX E/M VISIT ADD ON: HCPCS | Performed by: STUDENT IN AN ORGANIZED HEALTH CARE EDUCATION/TRAINING PROGRAM

## 2025-07-03 PROCEDURE — 3078F DIAST BP <80 MM HG: CPT | Performed by: STUDENT IN AN ORGANIZED HEALTH CARE EDUCATION/TRAINING PROGRAM

## 2025-07-03 PROCEDURE — 3074F SYST BP LT 130 MM HG: CPT | Performed by: STUDENT IN AN ORGANIZED HEALTH CARE EDUCATION/TRAINING PROGRAM

## 2025-07-03 PROCEDURE — 1036F TOBACCO NON-USER: CPT | Performed by: STUDENT IN AN ORGANIZED HEALTH CARE EDUCATION/TRAINING PROGRAM

## 2025-07-03 NOTE — PROGRESS NOTES
VASCULAR SURGERY   [address] [facility] [address], [facility] [address]  [phone] FAX: [phone]    Rosalee Dickinson  : 1950    Reason for visit: History of Type B aortic dissection.     Chief Complaint: 74 y.o. female presents HX Type B aortic dissection. Remodeling with non-aneurysmal descending thoracic aorta. 4.2cm ascending thoracic aortic aneurysm.  She reports BP well controlled and denies chest pain.     Plan:   4.2cm ascending thoracic aortic aneurysm: Referral to CV surgery.     Type B dissection: will follow up on imaging interval by CV surgery to continue to monitor descending thoracic aorta. Schedule f/u on same day as return for CV f/u. BP control with norvasc, metoprolol    Imaging interrupted:   CT Chest w/o contrast:   IMPRESSION:  Mild aneurysmal dilatation of the ascending thoracic aorta. Normal caliber of  the descending thoracic aorta. No aneurysm flap identified on this noncontrast  examination.    Physical Examination:   Vitals:    25 1050   BP: 107/65   Pulse: (!) 46   SpO2: 93%         General: No acute distress  HENT: AT  CV: Regular rhythm.    LUNG: No respiratory distress on RA  Abdominal: No distension.  Extremities: No wounds or edema, motor and sensation grossly intact    Past Medical History:   Diagnosis Date    Aortic dissection (HCC)     GERD (gastroesophageal reflux disease)     Hypertension     Moderate persistent asthma without complication 2016    Last Assessment & Plan:     Continue with Symbicort.  Well-controlled      Upper abdominal pain 2021    Added automatically from request for surgery [id number]         Current Outpatient Medications   Medication Sig Dispense Refill    atorvastatin (LIPITOR) 10 MG tablet Take 1 tablet by mouth daily 90 tablet 3    metoprolol succinate (TOPROL XL) 100 MG extended release tablet Take 1 tablet by mouth daily PT STILL TAKING 90 tablet 3    albuterol sulfate HFA (PROVENTIL;VENTOLIN;PROAIR) 108 (90 Base) MCG/ACT

## 2025-07-06 ENCOUNTER — RESULTS FOLLOW-UP (OUTPATIENT)
Dept: PRIMARY CARE CLINIC | Facility: CLINIC | Age: 75
End: 2025-07-06

## 2025-07-30 ENCOUNTER — OFFICE VISIT (OUTPATIENT)
Dept: GASTROENTEROLOGY | Age: 75
End: 2025-07-30
Payer: MEDICARE

## 2025-07-30 VITALS
WEIGHT: 127.4 LBS | RESPIRATION RATE: 18 BRPM | BODY MASS INDEX: 21.87 KG/M2 | DIASTOLIC BLOOD PRESSURE: 85 MMHG | SYSTOLIC BLOOD PRESSURE: 139 MMHG | OXYGEN SATURATION: 90 % | HEART RATE: 62 BPM

## 2025-07-30 DIAGNOSIS — R11.2 NAUSEA AND VOMITING, UNSPECIFIED VOMITING TYPE: Primary | ICD-10-CM

## 2025-07-30 DIAGNOSIS — K76.0 FATTY LIVER DISEASE, NONALCOHOLIC: ICD-10-CM

## 2025-07-30 PROCEDURE — 3017F COLORECTAL CA SCREEN DOC REV: CPT | Performed by: INTERNAL MEDICINE

## 2025-07-30 PROCEDURE — G8400 PT W/DXA NO RESULTS DOC: HCPCS | Performed by: INTERNAL MEDICINE

## 2025-07-30 PROCEDURE — 1123F ACP DISCUSS/DSCN MKR DOCD: CPT | Performed by: INTERNAL MEDICINE

## 2025-07-30 PROCEDURE — 1036F TOBACCO NON-USER: CPT | Performed by: INTERNAL MEDICINE

## 2025-07-30 PROCEDURE — 3079F DIAST BP 80-89 MM HG: CPT | Performed by: INTERNAL MEDICINE

## 2025-07-30 PROCEDURE — G8427 DOCREV CUR MEDS BY ELIG CLIN: HCPCS | Performed by: INTERNAL MEDICINE

## 2025-07-30 PROCEDURE — 1090F PRES/ABSN URINE INCON ASSESS: CPT | Performed by: INTERNAL MEDICINE

## 2025-07-30 PROCEDURE — 3075F SYST BP GE 130 - 139MM HG: CPT | Performed by: INTERNAL MEDICINE

## 2025-07-30 PROCEDURE — G8420 CALC BMI NORM PARAMETERS: HCPCS | Performed by: INTERNAL MEDICINE

## 2025-07-30 PROCEDURE — 99214 OFFICE O/P EST MOD 30 MIN: CPT | Performed by: INTERNAL MEDICINE

## 2025-07-30 RX ORDER — ETHAMBUTOL HYDROCHLORIDE 400 MG/1
1200 TABLET, FILM COATED ORAL
COMMUNITY
Start: 2025-06-25

## 2025-07-30 RX ORDER — RIFAMPIN 300 MG/1
600 CAPSULE ORAL
COMMUNITY
Start: 2025-06-25

## 2025-07-30 RX ORDER — AZITHROMYCIN 250 MG/1
500 TABLET, FILM COATED ORAL
COMMUNITY
Start: 2025-06-25 | End: 2025-09-23

## 2025-07-31 ENCOUNTER — INITIAL CONSULT (OUTPATIENT)
Dept: CARDIOTHORACIC SURGERY | Age: 75
End: 2025-07-31
Payer: MEDICARE

## 2025-07-31 VITALS
OXYGEN SATURATION: 94 % | WEIGHT: 126 LBS | SYSTOLIC BLOOD PRESSURE: 120 MMHG | HEIGHT: 64 IN | DIASTOLIC BLOOD PRESSURE: 80 MMHG | HEART RATE: 61 BPM | BODY MASS INDEX: 21.51 KG/M2

## 2025-07-31 DIAGNOSIS — I71.21 ANEURYSM OF ASCENDING AORTA WITHOUT RUPTURE: Primary | ICD-10-CM

## 2025-07-31 PROCEDURE — G8427 DOCREV CUR MEDS BY ELIG CLIN: HCPCS | Performed by: SURGERY

## 2025-07-31 PROCEDURE — 99204 OFFICE O/P NEW MOD 45 MIN: CPT | Performed by: SURGERY

## 2025-07-31 PROCEDURE — 3074F SYST BP LT 130 MM HG: CPT | Performed by: SURGERY

## 2025-07-31 PROCEDURE — 1036F TOBACCO NON-USER: CPT | Performed by: SURGERY

## 2025-07-31 PROCEDURE — 1159F MED LIST DOCD IN RCRD: CPT | Performed by: SURGERY

## 2025-07-31 PROCEDURE — G8420 CALC BMI NORM PARAMETERS: HCPCS | Performed by: SURGERY

## 2025-07-31 PROCEDURE — 1090F PRES/ABSN URINE INCON ASSESS: CPT | Performed by: SURGERY

## 2025-07-31 PROCEDURE — G8400 PT W/DXA NO RESULTS DOC: HCPCS | Performed by: SURGERY

## 2025-07-31 PROCEDURE — 3017F COLORECTAL CA SCREEN DOC REV: CPT | Performed by: SURGERY

## 2025-07-31 PROCEDURE — 1123F ACP DISCUSS/DSCN MKR DOCD: CPT | Performed by: SURGERY

## 2025-07-31 PROCEDURE — 3079F DIAST BP 80-89 MM HG: CPT | Performed by: SURGERY

## 2025-07-31 NOTE — PROGRESS NOTES
LCUIANO Yoon MD, MS Rosalee Dickinson         7/31/2025 1950       HISTORY OF PRESENT ILLNESS:  The patient is a 74 y.o. female who is seen for evaluation of ascending aortic aneurysm with a history of a chronic type B aortic dissection. She is known to our clinic as a family member of her  who we have also seen for an ascending aortic aneurysm. She denies any other acute complaint at this time.         Past Medical History:   Diagnosis Date    Aortic dissection (HCC)     GERD (gastroesophageal reflux disease)     Hypertension     Moderate persistent asthma without complication 08/02/2016    Last Assessment & Plan:     Continue with Symbicort.  Well-controlled      Upper abdominal pain 04/19/2021    Added automatically from request for surgery [id number]         Past Surgical History:   Procedure Laterality Date    COLONOSCOPY      2023 per pt    EGD      2023 per pt    FEMUR FRACTURE SURGERY Right 12/21/2022    RIGHT GAMMA NAIL  INSERTION performed by Corky Lyons MD at [facility] MAIN OR    OTHER SURGICAL HISTORY      hip repair   12/2022       Family History   Problem Relation Age of Onset    Heart Attack Mother     Heart Attack Father        Social History     Socioeconomic History    Marital status:      Spouse name: Not on file    Number of children: Not on file    Years of education: Not on file    Highest education level: Not on file   Occupational History    Not on file   Tobacco Use    Smoking status: Never     Passive exposure: Never    Smokeless tobacco: Never   Vaping Use    Vaping status: Never Used   Substance and Sexual Activity    Alcohol use: Not Currently    Drug use: Never    Sexual activity: Not on file   Other Topics Concern    Not on file   Social History Narrative    Not on file     Social Drivers of Health     Financial Resource Strain: Low Risk  (11/18/2024)    Overall Financial Resource Strain (CARDIA)     Difficulty of Paying Living Expenses: Not hard at all

## (undated) DEVICE — GLOVE SURG SZ 8 L12IN FNGR THK79MIL GRN LTX FREE

## (undated) DEVICE — INTENDED FOR TISSUE SEPARATION, AND OTHER PROCEDURES THAT REQUIRE A SHARP SURGICAL BLADE TO PUNCTURE OR CUT.: Brand: BARD-PARKER ® STAINLESS STEEL BLADES

## (undated) DEVICE — SHEET,DRAPE,53X77,STERILE: Brand: MEDLINE

## (undated) DEVICE — GUIDE WIRE, BALL-TIPPED, STERILE

## (undated) DEVICE — TFN: Brand: MEDLINE INDUSTRIES, INC.

## (undated) DEVICE — DRILL, AO, STERILE

## (undated) DEVICE — SOLUTION IRRIG 1000ML 09% SOD CHL USP PIC PLAS CONTAINER

## (undated) DEVICE — PAD,ABDOMINAL,5"X9",ST,LF,25/BX: Brand: MEDLINE INDUSTRIES, INC.

## (undated) DEVICE — GLOVE ORANGE PI 7 1/2   MSG9075

## (undated) DEVICE — K-WIRE

## (undated) DEVICE — DRAPE,U/SHT,SPLIT,FILM,60X84,STERILE: Brand: MEDLINE

## (undated) DEVICE — SHEET, DRAPE, SPLIT, STERILE: Brand: MEDLINE

## (undated) DEVICE — REAMER SHAFT, MOD.TRINKLE: Brand: BIXCUT

## (undated) DEVICE — SUTURE MCRYL SZ 2-0 L27IN ABSRB UD SH L26MM TAPERPOINT NDL Y417H

## (undated) DEVICE — 7 DAY SILVER-COATED ANTIMICROBIAL BARRIER DRESSING: Brand: ACTICOAT 7  4" X 5"